# Patient Record
Sex: FEMALE | Race: OTHER | URBAN - METROPOLITAN AREA
[De-identification: names, ages, dates, MRNs, and addresses within clinical notes are randomized per-mention and may not be internally consistent; named-entity substitution may affect disease eponyms.]

---

## 2020-07-20 ENCOUNTER — EMERGENCY (EMERGENCY)
Facility: HOSPITAL | Age: 70
LOS: 0 days | Discharge: HOME | End: 2020-07-20
Attending: EMERGENCY MEDICINE | Admitting: EMERGENCY MEDICINE
Payer: MEDICAID

## 2020-07-20 VITALS
RESPIRATION RATE: 18 BRPM | HEART RATE: 87 BPM | OXYGEN SATURATION: 97 % | SYSTOLIC BLOOD PRESSURE: 169 MMHG | DIASTOLIC BLOOD PRESSURE: 79 MMHG | TEMPERATURE: 98 F

## 2020-07-20 DIAGNOSIS — I10 ESSENTIAL (PRIMARY) HYPERTENSION: ICD-10-CM

## 2020-07-20 DIAGNOSIS — M79.669 PAIN IN UNSPECIFIED LOWER LEG: ICD-10-CM

## 2020-07-20 DIAGNOSIS — M25.561 PAIN IN RIGHT KNEE: ICD-10-CM

## 2020-07-20 PROCEDURE — 99284 EMERGENCY DEPT VISIT MOD MDM: CPT

## 2020-07-20 PROCEDURE — 73562 X-RAY EXAM OF KNEE 3: CPT | Mod: 26,RT

## 2020-07-20 PROCEDURE — 73552 X-RAY EXAM OF FEMUR 2/>: CPT | Mod: 26,RT

## 2020-07-20 PROCEDURE — 73590 X-RAY EXAM OF LOWER LEG: CPT | Mod: 26,RT

## 2020-07-20 PROCEDURE — 73502 X-RAY EXAM HIP UNI 2-3 VIEWS: CPT | Mod: 26,RT

## 2020-07-20 PROCEDURE — 93970 EXTREMITY STUDY: CPT | Mod: 26

## 2020-07-20 RX ORDER — KETOROLAC TROMETHAMINE 30 MG/ML
15 SYRINGE (ML) INJECTION ONCE
Refills: 0 | Status: DISCONTINUED | OUTPATIENT
Start: 2020-07-20 | End: 2020-07-20

## 2020-07-20 RX ADMIN — Medication 15 MILLIGRAM(S): at 17:41

## 2020-07-20 NOTE — ED ADULT NURSE NOTE - CAS ELECT INFOMATION PROVIDED
all results reviewed with pt and daughter, dc instructions reveiwed with pt and daughter - understanding of instructions verbalized/DC instructions

## 2020-07-20 NOTE — ED ADULT NURSE NOTE - CHIEF COMPLAINT QUOTE
Pt c/o right leg pain. Pt states she has been having pain behind the knee and when she was walking today she felt a crack and has been in more pain since.

## 2020-07-20 NOTE — ED PROVIDER NOTE - PATIENT PORTAL LINK FT
You can access the FollowMyHealth Patient Portal offered by [facility] by registering at the following website: [email or web address]. By joining [employer]’s FollowMyHealth portal, you will also be able to view your health information using other applications (apps) compatible with our system.

## 2020-07-20 NOTE — ED PROVIDER NOTE - PHYSICAL EXAMINATION
Physical Exam    Vital Signs: I have reviewed the initial vital signs.  Constitutional: well-nourished, appears stated age, no acute distress  Eyes: Conjunctiva pink, Sclera clear,  Cardiovascular: S1 and S2, regular rate, regular rhythm, well-perfused extremities, radial pulses equal and 2+, pedal pulses 2+ and equal   Musculoskeletal: supple neck, no lower extremity edema, no midline tenderness, mild ttp to posterior right knee, pelvis stable, able to range right knee, able to ambulate with some pain  Integumentary: warm, dry, no rash  Neurologic: awake, alert,nvi

## 2020-07-20 NOTE — ED PROVIDER NOTE - NSFOLLOWUPINSTRUCTIONS_ED_ALL_ED_FT
Knee Pain    WHAT YOU NEED TO KNOW:    Knee pain may start suddenly, or it may be a long-term problem. You may have pain on the side, front, or back of your knee. You may have knee stiffness and swelling. You may hear popping sounds or feel like your knee is giving way or locking up as you walk. You may feel pain when you sit, stand, walk, or climb up and down stairs. Knee pain can be caused by conditions such as obesity, inflammation, or strains or tears in ligaments or tendons.     DISCHARGE INSTRUCTIONS:    Return to the emergency department if:     Your pain is worse, even after treatment.       You cannot bend or straighten your leg completely.       The swelling around your knee does not go down even with treatment.      Your knee is painful and hot to the touch.     Contact your healthcare provider if:     You have questions or concerns about your condition or care.         Medicines: You may need any of the following:     NSAIDs help decrease swelling and pain or fever. This medicine is available with or without a doctor's order. NSAIDs can cause stomach bleeding or kidney problems in certain people. If you take blood thinner medicine, always ask your healthcare provider if NSAIDs are safe for you. Always read the medicine label and follow directions.      Acetaminophen decreases pain and fever. It is available without a doctor's order. Ask how much to take and how often to take it. Follow directions. Read the labels of all other medicines you are using to see if they also contain acetaminophen, or ask your doctor or pharmacist. Acetaminophen can cause liver damage if not taken correctly. Do not use more than 4 grams (4,000 milligrams) total of acetaminophen in one day.       Prescription pain medicine may be given. Ask your healthcare provider how to take this medicine safely. Some prescription pain medicines contain acetaminophen. Do not take other medicines that contain acetaminophen without talking to your healthcare provider. Too much acetaminophen may cause liver damage. Prescription pain medicine may cause constipation. Ask your healthcare provider how to prevent or treat constipation.       Take your medicine as directed. Contact your healthcare provider if you think your medicine is not helping or if you have side effects. Tell him or her if you are allergic to any medicine. Keep a list of the medicines, vitamins, and herbs you take. Include the amounts, and when and why you take them. Bring the list or the pill bottles to follow-up visits. Carry your medicine list with you in case of an emergency.    What you can do to manage your symptoms:     Rest your knee so it can heal. Limit activities that increase your pain. Do low-impact exercises, such as walking or swimming.       Apply ice to help reduce swelling and pain. Use an ice pack, or put crushed ice in a plastic bag. Cover it with a towel before you apply it to your knee. Apply ice for 15 to 20 minutes every hour, or as directed.      Apply compression to help reduce swelling. Use a brace or bandage only as directed.      Elevate your knee to help decrease pain and swelling. Elevate your knee while you are sitting or lying down. Prop your leg on pillows to keep your knee above the level of your heart.      Prevent your knee from moving as directed. Your healthcare provider may put on a cast or splint. You may need to wear a leg brace to stabilize your knee. A leg brace can be adjusted to increase your range of motion as your knee heals.Hinged Knee Braces          What you can do to prevent knee pain:     Maintain a healthy weight. Extra weight increases your risk for knee pain. Ask your healthcare provider how much you should weigh. He or she can help you create a safe weight loss plan if you need to lose weight.      Exercise or train properly. Use the correct equipment for sports. Wear shoes that provide good support. Check your posture often as you exercise, play sports, or train for an event. This can help prevent stress and strain on your knees. Rest between sessions so you do not overwork your knees.    Follow up with your healthcare provider within 24 hours or as directed: You may need follow-up treatments, such as steroid injections to decrease pain. Write down your questions so you remember to ask them during your visits.        © Copyright [employer] 2019 All illustrations and images included in CareNotes are the copyrighted property of [employer] Inc[employer] or [employer].

## 2020-07-20 NOTE — ED PROVIDER NOTE - PROGRESS NOTE DETAILS
Attending Note:   68 yo F presents c/o few days of R posterior knee pain with walking. On exam: FROM of knee but pain with ambulation. Able to bare weight with pain. Plan: XR and DVT study

## 2020-07-20 NOTE — ED PROVIDER NOTE - CLINICAL SUMMARY MEDICAL DECISION MAKING FREE TEXT BOX
pt with posterior right knee pain, no signs of cellulitis, xray wnl, no dvt. pt ambulated given pain meds dc home

## 2020-07-20 NOTE — ED PROVIDER NOTE - OBJECTIVE STATEMENT
70 yo female, pmh of htn, presents to ed for right knee pain. started one weke ago, mild, aching, no radiation, worse with movement. Denies fever, chills, falls, numbness, tingling.

## 2021-07-27 PROBLEM — Z00.00 ENCOUNTER FOR PREVENTIVE HEALTH EXAMINATION: Status: ACTIVE | Noted: 2021-07-27

## 2022-05-29 ENCOUNTER — EMERGENCY (EMERGENCY)
Facility: HOSPITAL | Age: 72
LOS: 0 days | Discharge: HOME | End: 2022-05-29
Attending: EMERGENCY MEDICINE | Admitting: EMERGENCY MEDICINE
Payer: MEDICAID

## 2022-05-29 VITALS
RESPIRATION RATE: 20 BRPM | HEART RATE: 82 BPM | OXYGEN SATURATION: 98 % | TEMPERATURE: 98 F | SYSTOLIC BLOOD PRESSURE: 138 MMHG | WEIGHT: 164.91 LBS | DIASTOLIC BLOOD PRESSURE: 67 MMHG
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
## 2022-05-29 DIAGNOSIS — I10 ESSENTIAL (PRIMARY) HYPERTENSION: ICD-10-CM

## 2022-05-29 DIAGNOSIS — M25.562 PAIN IN LEFT KNEE: ICD-10-CM

## 2022-05-29 DIAGNOSIS — E03.9 HYPOTHYROIDISM, UNSPECIFIED: ICD-10-CM

## 2022-05-29 DIAGNOSIS — J45.909 UNSPECIFIED ASTHMA, UNCOMPLICATED: ICD-10-CM

## 2022-05-29 DIAGNOSIS — M79.89 OTHER SPECIFIED SOFT TISSUE DISORDERS: ICD-10-CM

## 2022-05-29 DIAGNOSIS — M79.605 PAIN IN LEFT LEG: ICD-10-CM

## 2022-05-29 DIAGNOSIS — S80.02XA CONTUSION OF LEFT KNEE, INITIAL ENCOUNTER: ICD-10-CM

## 2022-05-29 DIAGNOSIS — S80.12XA CONTUSION OF LEFT LOWER LEG, INITIAL ENCOUNTER: ICD-10-CM

## 2022-05-29 PROCEDURE — 73502 X-RAY EXAM HIP UNI 2-3 VIEWS: CPT | Mod: 26,LT

## 2022-05-29 PROCEDURE — 99284 EMERGENCY DEPT VISIT MOD MDM: CPT

## 2022-05-29 PROCEDURE — 93971 EXTREMITY STUDY: CPT | Mod: 26,LT

## 2022-05-29 PROCEDURE — 73562 X-RAY EXAM OF KNEE 3: CPT | Mod: 26,LT

## 2022-05-29 PROCEDURE — 73552 X-RAY EXAM OF FEMUR 2/>: CPT | Mod: 26,LT

## 2022-05-29 NOTE — ED PROVIDER NOTE - NSFOLLOWUPINSTRUCTIONS_ED_ALL_ED_FT
Fall Prevention in the Home    Falls can cause injuries. They can happen to people of all ages. There are many things you can do to make your home safe and to help prevent falls.     WHAT CAN I DO ON THE OUTSIDE OF MY HOME?  Regularly fix the edges of walkways and driveways and fix any cracks.  Remove anything that might make you trip as you walk through a door, such as a raised step or threshold.  Trim any bushes or trees on the path to your home.  Use bright outdoor lighting.  Clear any walking paths of anything that might make someone trip, such as rocks or tools.  Regularly check to see if handrails are loose or broken. Make sure that both sides of any steps have handrails.  Any raised decks and porches should have guardrails on the edges.  Have any leaves, snow, or ice cleared regularly.  Use sand or salt on walking paths during winter.  Clean up any spills in your garage right away. This includes oil or grease spills.    WHAT CAN I DO IN THE BATHROOM?  Use night lights.  Install grab bars by the toilet and in the tub and shower. Do not use towel bars as grab bars.  Use non-skid mats or decals in the tub or shower.  If you need to sit down in the shower, use a plastic, non-slip stool.  Keep the floor dry. Clean up any water that spills on the floor as soon as it happens.  Remove soap buildup in the tub or shower regularly.  Attach bath mats securely with double-sided non-slip rug tape.  Do not have throw rugs and other things on the floor that can make you trip.    WHAT CAN I DO IN THE BEDROOM?  Use night lights.  Make sure that you have a light by your bed that is easy to reach.  Do not use any sheets or blankets that are too big for your bed. They should not hang down onto the floor.  Have a firm chair that has side arms. You can use this for support while you get dressed.  Do not have throw rugs and other things on the floor that can make you trip.    WHAT CAN I DO IN THE KITCHEN?  Clean up any spills right away.  Avoid walking on wet floors.  Keep items that you use a lot in easy-to-reach places.  If you need to reach something above you, use a strong step stool that has a grab bar.  Keep electrical cords out of the way.  Do not use floor polish or wax that makes floors slippery. If you must use wax, use non-skid floor wax.  Do not have throw rugs and other things on the floor that can make you trip.    WHAT CAN I DO WITH MY STAIRS?  Do not leave any items on the stairs.  Make sure that there are handrails on both sides of the stairs and use them. Fix handrails that are broken or loose. Make sure that handrails are as long as the stairways.  Check any carpeting to make sure that it is firmly attached to the stairs. Fix any carpet that is loose or worn.  Avoid having throw rugs at the top or bottom of the stairs. If you do have throw rugs, attach them to the floor with carpet tape.  Make sure that you have a light switch at the top of the stairs and the bottom of the stairs. If you do not have them, ask someone to add them for you.    WHAT ELSE CAN I DO TO HELP PREVENT FALLS?  Wear shoes that:  Do not have high heels.  Have rubber bottoms.  Are comfortable and fit you well.  Are closed at the toe. Do not wear sandals.  If you use a stepladder:  Make sure that it is fully opened. Do not climb a closed stepladder.  Make sure that both sides of the stepladder are locked into place.  Ask someone to hold it for you, if possible.  Clearly ramona and make sure that you can see:  Any grab bars or handrails.  First and last steps.  Where the edge of each step is.  Use tools that help you move around (mobility aids) if they are needed. These include:  Canes.  Walkers.  Scooters.  Crutches.  Turn on the lights when you go into a dark area. Replace any light bulbs as soon as they burn out.  Set up your furniture so you have a clear path. Avoid moving your furniture around.  If any of your floors are uneven, fix them.  If there are any pets around you, be aware of where they are.  Review your medicines with your doctor. Some medicines can make you feel dizzy. This can increase your chance of falling.    Ask your doctor what other things that you can do to help prevent falls.    ADDITIONAL NOTES AND INSTRUCTIONS    Please follow up with your Primary MD in 24-48 hr.  Seek immediate medical care for any new/worsening signs or symptoms.         Knee Pain    Knee pain is a very common symptom and can have many causes. Knee pain often goes away when you follow your health care provider's instructions for relieving pain and discomfort at home. However, knee pain can develop into a condition that needs treatment. Some conditions may include:     Arthritis caused by wear and tear (osteoarthritis).  Arthritis caused by swelling and irritation (rheumatoid arthritis or gout).  A cyst or growth in your knee.  An infection in your knee joint.  An injury that will not heal.  Damage, swelling, or irritation of the tissues that support your knee (torn ligaments or tendinitis).    If your knee pain continues, additional tests may be ordered to diagnose your condition. Tests may include X-rays or other imaging studies of your knee. You may also need to have fluid removed from your knee. Treatment for ongoing knee pain depends on the cause, but treatment may include:    Medicines to relieve pain or swelling.  Steroid injections in your knee.  Physical therapy.  Surgery.    HOME CARE INSTRUCTIONS  Take medicines only as directed by your health care provider.   Rest your knee and keep it raised (elevated) while you are resting.  Do not do things that cause or worsen pain.  Avoid high-impact activities or exercises, such as running, jumping rope, or doing jumping jacks.  Apply ice to the knee area:  Put ice in a plastic bag.  Place a towel between your skin and the bag.  Leave the ice on for 20 minutes, 2–3 times a day.  Ask your health care provider if you should wear an elastic knee support.  Keep a pillow under your knee when you sleep.  Lose weight if you are overweight. Extra weight can put pressure on your knee.  Do not use any tobacco products, including cigarettes, chewing tobacco, or electronic cigarettes. If you need help quitting, ask your health care provider. Smoking may slow the healing of any bone and joint problems that you may have.    SEEK MEDICAL CARE IF:  Your knee pain continues, changes, or gets worse.  You have a fever along with knee pain.  Your knee brennen or locks up.  Your knee becomes more swollen.    SEEK IMMEDIATE MEDICAL CARE IF:  Your knee joint feels hot to the touch.  You have chest pain or trouble breathing.

## 2022-05-29 NOTE — ED PROVIDER NOTE - NS ED ATTENDING STATEMENT MOD
This was a shared visit with the [facility]. I reviewed and verified the documentation and independently performed the documented:

## 2022-05-29 NOTE — ED PROVIDER NOTE - CLINICAL SUMMARY MEDICAL DECISION MAKING FREE TEXT BOX
x-rays neg for fx.  Prelim LE duplex neg for DVT. pt to f/u with ortho as outpt, told to return to ER if she feels worse or for any other new/concerning symptoms. pt understands and agrees with plan.

## 2022-05-29 NOTE — ED PROVIDER NOTE - PATIENT PORTAL LINK FT
You can access the FollowMyHealth Patient Portal offered by [facility] by registering at the following website: [email or web address]. By joining [employer]’s FollowMyHealth portal, you will also be able to view your health information using other applications (apps) compatible with our system.

## 2022-05-29 NOTE — ED PROVIDER NOTE - PHYSICAL EXAMINATION
Constitutional: Well developed, well nourished. NAD  Head: Normocephalic, atraumatic.  Eyes: PERRL, EOMI.  ENT: No nasal discharge. Mucous membranes dry.  Neck: Supple. Painless ROM.  Cardiovascular: Normal S1, S2. Regular rate and rhythm.    Pulmonary: Normal respiratory rate and effort. Lungs clear to auscultation bilaterally.    Abdominal: Soft. Nondistended. No rebound, guarding, rigidity.  Extremities. Pelvis stable. no Left hip tenderness; + mild left knee tenderness, flex/ext intact with tenderness; no effusion no joint laxity; +eccyhymosis noted to left lower calf extending to lower ankle(no ankle tenderness) pulses and sensation intact bilat;  Skin: No rashes, cyanosis.  Neuro: AAOx3. No focal neurological deficits.  Psych: Normal mood. Normal affect.

## 2022-05-29 NOTE — ED PROVIDER NOTE - CARE PLAN
Principal Discharge DX:	Fall  Secondary Diagnosis:	Knee contusion  Secondary Diagnosis:	Traumatic ecchymosis of left lower leg   1

## 2022-05-29 NOTE — ED PROVIDER NOTE - CARE PROVIDER_API CALL
Matthieu Marroquin)  Orthopaedic Surgery  [address] [place] [address]  Phone: [phone]  Fax: [phone]  Follow Up Time: 1-3 Days

## 2022-05-29 NOTE — ED PROVIDER NOTE - NS ED ROS FT
Constitutional: (-) fever  Cardiovascular: (-) syncope  Integumentary: (-) rash  Musculoskeltal: Left leg pain s/p fall 1 week ago  Neurological: (-) altered mental status

## 2022-05-29 NOTE — ED PROVIDER NOTE - ATTENDING APP SHARED VISIT CONTRIBUTION OF CARE
72 y/o female with no asthma, htn, hypothyroidism, in ER for eval of fall which occurred 1 week ago. Pt tripped and fell onto L knee.  did not hit head, no LOC.  Having pain to knee/distal thigh, and now having some calf swelling. no CP/SOB.  no palpitations.  no ha/dizziness/syncope/near syncope.  no f/c.  no abd pain. no motor weakness/paresthesias.  PE - nad, nc/at, eomi, perrl, op - clear, mmm, no c-spine tenderness, cta b/l, no w/r/r, rrr, abd- soft, nt/nd, nabs, from x 4, LLE: able to range fully at hip, + mild tenderness to L knee, + ecchymosis to calf, 2+ DP pulse, A&O x 3, cn 2-12 intact, motor 5/5 b/l UE and LE, no sensory deficits.  -LLE x-rays, LE duplex.

## 2022-05-29 NOTE — ED PROVIDER NOTE - OBJECTIVE STATEMENT
71 yold female to Ed [facility] Asthma, Htn, hypothyroidism s/p fall 1 week ago in [place] - accidentally tripped and fell forward on left knee; pt c/o pain to left knee, distal femur and now with swelling to calf/lower ext; pt denies head injury, neck, chest, back or abdominal pain; pt ambulatory with pain worse over past 3 days; pt taking motrin for pain; denies use of asa/AC

## 2022-05-29 NOTE — ED ADULT NURSE NOTE - OBJECTIVE STATEMENT
Patient presents to ED in NAD a+ox4 co left lower leg pain s/p trip and fall 1 week ago. Pt denies LOC, hitting head, AC use. Bruising and discoloration noted to left leg. Pedal pulses present. Pt denies any other complaints.

## 2022-05-29 NOTE — ED ADULT NURSE NOTE - INTERVENTIONS DEFINITIONS
[place] to call system/Call bell, personal items and telephone within reach/Instruct patient to call for assistance/Room bathroom lighting operational/Non-slip footwear when patient is off stretcher/Physically safe environment: no spills, clutter or unnecessary equipment/Stretcher in lowest position, wheels locked, appropriate side rails in place/Monitor gait and stability/Monitor for mental status changes and reorient to person, place, and time/Review medications for side effects contributing to fall risk/Reinforce activity limits and safety measures with patient and family/Provide visual clues: red socks

## 2022-11-04 ENCOUNTER — EMERGENCY (EMERGENCY)
Facility: HOSPITAL | Age: 72
LOS: 0 days | Discharge: HOME | End: 2022-11-04
Admitting: STUDENT IN AN ORGANIZED HEALTH CARE EDUCATION/TRAINING PROGRAM

## 2022-11-04 VITALS
SYSTOLIC BLOOD PRESSURE: 121 MMHG | WEIGHT: 173.06 LBS | TEMPERATURE: 97 F | RESPIRATION RATE: 18 BRPM | HEART RATE: 71 BPM | DIASTOLIC BLOOD PRESSURE: 60 MMHG | OXYGEN SATURATION: 97 %

## 2022-11-04 VITALS
OXYGEN SATURATION: 97 % | RESPIRATION RATE: 18 BRPM | SYSTOLIC BLOOD PRESSURE: 123 MMHG | TEMPERATURE: 98 F | DIASTOLIC BLOOD PRESSURE: 60 MMHG | HEART RATE: 70 BPM

## 2022-11-04 DIAGNOSIS — M79.89 OTHER SPECIFIED SOFT TISSUE DISORDERS: ICD-10-CM

## 2022-11-04 DIAGNOSIS — M79.661 PAIN IN RIGHT LOWER LEG: ICD-10-CM

## 2022-11-04 PROCEDURE — 99285 EMERGENCY DEPT VISIT HI MDM: CPT

## 2022-11-04 PROCEDURE — 93970 EXTREMITY STUDY: CPT | Mod: 26

## 2022-11-04 NOTE — ED PROVIDER NOTE - CARE PROVIDER_API CALL
Darian Sales)  Surgery; Vascular Surgery  [address] [place] [address]  Phone: [phone]  Fax: [phone]  Follow Up Time:

## 2022-11-04 NOTE — ED PROVIDER NOTE - NSFOLLOWUPINSTRUCTIONS_ED_ALL_ED_FT
WHAT YOU NEED TO KNOW:    Leg edema is swelling caused by fluid buildup. Your legs may swell if you sit or stand for long periods of time, are pregnant, or are injured. Swelling may also occur if you have heart failure or circulation problems. This means that your heart does not pump blood through your body as it should.    DISCHARGE INSTRUCTIONS:    Self-care:     Elevate your legs: Raise your legs above the level of your heart as often as you can. This will help decrease swelling and pain. Prop your legs on pillows or blankets to keep them elevated comfortably.      Wear pressure stockings: These tight stockings put pressure on your legs to promote blood flow and prevent blood clots. Wear the stockings during the day. Do not wear them while you sleep.      Apply heat: Heat helps decrease pain and swelling. Apply heat on the area for 20 to 30 minutes every 2 hours for as many days as directed.       Stay active: Do not stand or sit for long periods of time. Ask your healthcare provider about the best exercise plan for you.      Eat healthy foods: Healthy foods include fruits, vegetables, whole-grain breads, low-fat dairy products, beans, lean meats, and fish. Ask if you need to be on a special diet. Limit salt. Salt will make your body hold even more fluid.    Follow up with your healthcare provider as directed: Write down your questions so you remember to ask them during your visits.     Contact your healthcare provider if:     You have a fever or feel more tired than usual.      The veins in your legs look larger than usual. They may look full or bulging.      Your legs itch or feel heavy.      You have red or white areas or sores on your legs. The skin may also appear dimpled or have indentations.      You are gaining weight.      You have trouble moving your ankles.      The swelling does not go away, or other parts of your body swell.      You have questions or concerns about your condition or care.    Return to the emergency department if:     You cannot walk.      You feel faint or confused.       Your skin turns blue or gray.      Your leg feels warm, tender, and painful. It may be swollen and red.      You have chest pain or trouble breathing that is worse when you lie down.      You suddenly feel lightheaded and have trouble breathing.      You have new and sudden chest pain. You may have more pain when you take deep breaths or cough. You may also cough up blood. Follow up with vascular surgery within 2-4 days for reassessment.  Wear compression stocking and elevate leg 3-4 times a day for 15-20 minutes.   Return for chest, pain, shortness of breath, lightheadedness, passing out or other concerning symptoms.       WHAT YOU NEED TO KNOW:    Leg edema is swelling caused by fluid buildup. Your legs may swell if you sit or stand for long periods of time, are pregnant, or are injured. Swelling may also occur if you have heart failure or circulation problems. This means that your heart does not pump blood through your body as it should.    DISCHARGE INSTRUCTIONS:    Self-care:     Elevate your legs: Raise your legs above the level of your heart as often as you can. This will help decrease swelling and pain. Prop your legs on pillows or blankets to keep them elevated comfortably.      Wear pressure stockings: These tight stockings put pressure on your legs to promote blood flow and prevent blood clots. Wear the stockings during the day. Do not wear them while you sleep.      Apply heat: Heat helps decrease pain and swelling. Apply heat on the area for 20 to 30 minutes every 2 hours for as many days as directed.       Stay active: Do not stand or sit for long periods of time. Ask your healthcare provider about the best exercise plan for you.      Eat healthy foods: Healthy foods include fruits, vegetables, whole-grain breads, low-fat dairy products, beans, lean meats, and fish. Ask if you need to be on a special diet. Limit salt. Salt will make your body hold even more fluid.    Follow up with your healthcare provider as directed: Write down your questions so you remember to ask them during your visits.     Contact your healthcare provider if:     You have a fever or feel more tired than usual.      The veins in your legs look larger than usual. They may look full or bulging.      Your legs itch or feel heavy.      You have red or white areas or sores on your legs. The skin may also appear dimpled or have indentations.      You are gaining weight.      You have trouble moving your ankles.      The swelling does not go away, or other parts of your body swell.      You have questions or concerns about your condition or care.    Return to the emergency department if:     You cannot walk.      You feel faint or confused.       Your skin turns blue or gray.      Your leg feels warm, tender, and painful. It may be swollen and red.      You have chest pain or trouble breathing that is worse when you lie down.      You suddenly feel lightheaded and have trouble breathing.      You have new and sudden chest pain. You may have more pain when you take deep breaths or cough. You may also cough up blood.

## 2022-11-04 NOTE — ED ADULT NURSE NOTE - NSIMPLEMENTINTERV_GEN_ALL_ED
[employer] dose 1 and 2
Implemented All Universal Safety Interventions:  [place] to call system. Call bell, personal items and telephone within reach. Instruct patient to call for assistance. Room bathroom lighting operational. Non-slip footwear when patient is off stretcher. Physically safe environment: no spills, clutter or unnecessary equipment. Stretcher in lowest position, wheels locked, appropriate side rails in place.

## 2022-11-04 NOTE — ED PROVIDER NOTE - ATTENDING APP SHARED VISIT CONTRIBUTION OF CARE
71 yo f hx htn  pt presents for eval of ~1 week of RLE swelling. mild pain. pt states she thought it was related to med adjustment (starting losartan) but dc losartan did not improve the swelling. pt came in to r/o dvt. no cp/sob/fevers.    vss  gen- NAD, aaox3  card-rrr  lungs-ctab, no wheezing or rhonchi  neuro- full str/sensation, cn ii-xii grossly intact, normal coordination and gait  RLE- swelling and mild tenderness to calf, multiple varicose veins, DP 2+    will r/o dvt

## 2022-11-04 NOTE — ED PROVIDER NOTE - OBJECTIVE STATEMENT
73 yo female, presents to ed for right lower leg swelling, x 1 week, some pain, mild, aching, no radiation. Denies fever, chills, cp, sob, neck pain, visual changes, nvd, dizziness, numbness, tingling.

## 2022-11-04 NOTE — ED ADULT NURSE NOTE - NSFALLRSKASSESSTYPE_ED_ALL_ED
Addended by: ALEXANDRIA SUE on: 8/23/2021 03:16 PM     Modules accepted: Orders     Initial (On Arrival)

## 2022-11-04 NOTE — ED PROVIDER NOTE - PHYSICAL EXAMINATION
Physical Exam    Vital Signs: I have reviewed the initial vital signs.  Constitutional: appears stated age, no acute distress  Eyes: Conjunctiva pink, Sclera clear  Musculoskeletal: supple neck, mild right lower extremity edema, no midline tenderness  Integumentary: warm, dry, no rash  Neurologic: awake, alert, nvi

## 2022-11-04 NOTE — ED PROVIDER NOTE - NS ED ROS FT
Constitutional: (-) fever, (-) chills  Eyes: (-) visual changes  ENT: (-) nasal congestions  Cardiovascular: (-) chest pain, (-) syncope  Respiratory: (-) cough, (-) shortness of breath, (-) dyspnea,   Gastrointestinal: (-) vomiting, (-) diarrhea, (-)nausea,  Musculoskeletal: (-) neck pain, (-) back pain, (+) leg pain,  Integumentary: (-) rash, (-) edema, (-) bruises  Neuro: (-) tingling, (-)numbness,  Peripheral Vascular: (+) leg swelling  :  (-)dysuria,  (-) hematuria  Allergic/Immunologic: (-) pruritus

## 2022-11-04 NOTE — ED PROVIDER NOTE - PATIENT PORTAL LINK FT
You can access the FollowMyHealth Patient Portal offered by [facility] by registering at the following website: [email or web address]. By joining [employer]’s FollowMyHealth portal, you will also be able to view your health information using other applications (apps) compatible with our system.

## 2022-11-15 NOTE — ED ADULT TRIAGE NOTE - PAIN: PRESENCE, MLM
Body Location Override (Optional - Billing Will Still Be Based On Selected Body Map Location If Applicable): superior chin Size Of Lesion: 1.5cm Morphology Per Location (Optional): even dark brown grey thin papule Detail Level: Detailed Size Of Lesion: 5mm Morphology Per Location (Optional): even dark brown grey smooth papule Body Location Override (Optional - Billing Will Still Be Based On Selected Body Map Location If Applicable): inferior chin complains of pain/discomfort

## 2022-11-17 ENCOUNTER — APPOINTMENT (OUTPATIENT)
Dept: VASCULAR SURGERY | Facility: CLINIC | Age: 72
End: 2022-11-17
Payer: SUBSIDIZED

## 2022-11-17 VITALS — BODY MASS INDEX: 35.68 KG/M2 | WEIGHT: 170 LBS | HEIGHT: 58 IN

## 2022-11-17 DIAGNOSIS — E78.00 PURE HYPERCHOLESTEROLEMIA, UNSPECIFIED: ICD-10-CM

## 2022-11-17 DIAGNOSIS — I10 ESSENTIAL (PRIMARY) HYPERTENSION: ICD-10-CM

## 2022-11-17 DIAGNOSIS — J45.909 UNSPECIFIED ASTHMA, UNCOMPLICATED: ICD-10-CM

## 2022-11-17 PROCEDURE — 99204 OFFICE O/P NEW MOD 45 MIN: CPT

## 2022-11-17 PROCEDURE — 93970 EXTREMITY STUDY: CPT

## 2022-11-17 RX ORDER — LISINOPRIL 10 MG/1
10 TABLET ORAL
Refills: 0 | Status: ACTIVE | COMMUNITY

## 2022-11-17 RX ORDER — ASPIRIN 81 MG
81 TABLET, DELAYED RELEASE (ENTERIC COATED) ORAL
Refills: 0 | Status: ACTIVE | COMMUNITY

## 2022-11-17 RX ORDER — GABAPENTIN 100 MG/1
100 CAPSULE ORAL
Refills: 0 | Status: ACTIVE | COMMUNITY

## 2022-11-17 RX ORDER — HYDROCHLOROTHIAZIDE 25 MG/1
25 TABLET ORAL
Refills: 0 | Status: ACTIVE | COMMUNITY

## 2022-11-17 RX ORDER — ROSUVASTATIN CALCIUM 5 MG/1
5 TABLET, FILM COATED ORAL
Refills: 0 | Status: ACTIVE | COMMUNITY

## 2022-11-17 RX ORDER — DOCUSATE SODIUM 100 MG/1
100 CAPSULE, LIQUID FILLED ORAL
Refills: 0 | Status: ACTIVE | COMMUNITY

## 2022-11-17 RX ORDER — LEVOTHYROXINE SODIUM 88 UG/1
88 TABLET ORAL
Refills: 0 | Status: ACTIVE | COMMUNITY

## 2022-11-17 NOTE — HISTORY OF PRESENT ILLNESS
[FreeTextEntry1] : 71 y/o female sent in for evaluation of right leg swelling worsening over the past 2 weeks, denies any h/o DVT. Swelling improves with leg elevation and worse at the end of the day, denies any fevers, or chills. C/o left leg pain.

## 2022-11-17 NOTE — ASSESSMENT
[FreeTextEntry1] : 73 y/o female with right leg swelling for the past 2 weeks.\par \par No evidence of DVT in the lower extremities on duplex today.\par \par I am prescribing her thigh- high compression stockings to be worn daily during the day and I will see her back in 2 weeks time.

## 2022-11-17 NOTE — DATA REVIEWED
[FreeTextEntry1] : I performed a venous duplex which was medically necessary to evaluate for DVT or iliac vein compression. It showed no evidence of DVT in the lower extremities bilaterally, no evidence of iliac vein thrombosis.\par

## 2022-12-01 ENCOUNTER — APPOINTMENT (OUTPATIENT)
Dept: VASCULAR SURGERY | Facility: CLINIC | Age: 72
End: 2022-12-01

## 2022-12-01 VITALS — BODY MASS INDEX: 36.31 KG/M2 | HEIGHT: 58 IN | WEIGHT: 173 LBS

## 2022-12-01 PROCEDURE — 99213 OFFICE O/P EST LOW 20 MIN: CPT

## 2022-12-01 PROCEDURE — 93971 EXTREMITY STUDY: CPT | Mod: RT

## 2022-12-01 RX ORDER — CEPHALEXIN 500 MG/1
500 TABLET ORAL 3 TIMES DAILY
Qty: 30 | Refills: 0 | Status: ACTIVE | COMMUNITY
Start: 2022-12-01 | End: 1900-01-01

## 2022-12-01 NOTE — DATA REVIEWED
[FreeTextEntry1] : I performed a venous duplex which was medically necessary to evaluate for DVT or iliac vein compression. It showed no evidence of DVT in the lower extremity on the right\par

## 2022-12-01 NOTE — ASSESSMENT
[FreeTextEntry1] : 71 y/o female with right leg swelling for the past 2 weeks.\par \par No evidence of DVT in the lower extremities on duplex today.\par \par The leg appears more swollen with warmth and erythema. I started her on antibiotics for 10 days. If this does not improve I will consider a venogram.  No

## 2022-12-01 NOTE — HISTORY OF PRESENT ILLNESS
[FreeTextEntry1] : 73 y/o female sent in for evaluation of right leg swelling worsening over the past 2 weeks, denies any h/o DVT. Swelling improves with leg elevation and worse at the end of the day, denies any fevers, or chills. C/o left leg pain.\par \par She returns today with worsening swelling in the leg.

## 2022-12-08 ENCOUNTER — APPOINTMENT (OUTPATIENT)
Dept: VASCULAR SURGERY | Facility: CLINIC | Age: 72
End: 2022-12-08

## 2022-12-08 VITALS — WEIGHT: 175 LBS | BODY MASS INDEX: 36.73 KG/M2 | HEIGHT: 58 IN

## 2022-12-08 PROCEDURE — 99212 OFFICE O/P EST SF 10 MIN: CPT

## 2022-12-08 NOTE — HISTORY OF PRESENT ILLNESS
[FreeTextEntry1] : 71 y/o female sent in for evaluation of right leg swelling worsening over the past 2 weeks, denies any h/o DVT. Swelling improves with leg elevation and worse at the end of the day, denies any fevers, or chills. C/o left leg pain.\par \par She returns today with worsening swelling in the leg.

## 2022-12-08 NOTE — ASSESSMENT
[FreeTextEntry1] : 71 y/o female with right leg swelling for the past 2 weeks.\par \par No evidence of DVT in the lower extremities on duplex today.\par \par The leg appears more swollen with warmth and erythema. I started her on antibiotics for 10 days. The leg is not improving. I will plan on a venogram next week.

## 2022-12-11 ENCOUNTER — LABORATORY RESULT (OUTPATIENT)
Age: 72
End: 2022-12-11

## 2022-12-14 ENCOUNTER — TRANSCRIPTION ENCOUNTER (OUTPATIENT)
Age: 72
End: 2022-12-14

## 2022-12-14 ENCOUNTER — OUTPATIENT (OUTPATIENT)
Dept: OUTPATIENT SERVICES | Facility: HOSPITAL | Age: 72
LOS: 1 days | Discharge: HOME | End: 2022-12-14
Payer: SUBSIDIZED

## 2022-12-14 VITALS
HEART RATE: 78 BPM | RESPIRATION RATE: 20 BRPM | DIASTOLIC BLOOD PRESSURE: 68 MMHG | WEIGHT: 169.98 LBS | TEMPERATURE: 98 F | OXYGEN SATURATION: 95 % | HEIGHT: 64 IN | SYSTOLIC BLOOD PRESSURE: 135 MMHG

## 2022-12-14 VITALS
SYSTOLIC BLOOD PRESSURE: 128 MMHG | RESPIRATION RATE: 16 BRPM | DIASTOLIC BLOOD PRESSURE: 68 MMHG | HEART RATE: 65 BPM | OXYGEN SATURATION: 99 %

## 2022-12-14 PROCEDURE — 37252 INTRVASC US NONCORONARY 1ST: CPT

## 2022-12-14 PROCEDURE — 37248 TRLUML BALO ANGIOP 1ST VEIN: CPT

## 2022-12-14 PROCEDURE — 36011 PLACE CATHETER IN VEIN: CPT

## 2022-12-14 PROCEDURE — 76937 US GUIDE VASCULAR ACCESS: CPT | Mod: 26

## 2022-12-14 RX ORDER — HYDROMORPHONE HYDROCHLORIDE 2 MG/ML
0.5 INJECTION INTRAMUSCULAR; INTRAVENOUS; SUBCUTANEOUS
Refills: 0 | Status: DISCONTINUED | OUTPATIENT
Start: 2022-12-14 | End: 2022-12-14

## 2022-12-14 RX ORDER — MEPERIDINE HYDROCHLORIDE 50 MG/ML
12.5 INJECTION INTRAMUSCULAR; INTRAVENOUS; SUBCUTANEOUS
Refills: 0 | Status: DISCONTINUED | OUTPATIENT
Start: 2022-12-14 | End: 2022-12-14

## 2022-12-14 RX ORDER — HYDROMORPHONE HYDROCHLORIDE 2 MG/ML
1 INJECTION INTRAMUSCULAR; INTRAVENOUS; SUBCUTANEOUS
Refills: 0 | Status: DISCONTINUED | OUTPATIENT
Start: 2022-12-14 | End: 2022-12-14

## 2022-12-14 RX ORDER — LEVOTHYROXINE SODIUM 125 MCG
1 TABLET ORAL
Qty: 0 | Refills: 0 | DISCHARGE

## 2022-12-14 RX ORDER — SODIUM CHLORIDE 9 MG/ML
1000 INJECTION, SOLUTION INTRAVENOUS
Refills: 0 | Status: DISCONTINUED | OUTPATIENT
Start: 2022-12-14 | End: 2022-12-14

## 2022-12-14 RX ORDER — OXYCODONE AND ACETAMINOPHEN 5; 325 MG/1; MG/1
2 TABLET ORAL ONCE
Refills: 0 | Status: DISCONTINUED | OUTPATIENT
Start: 2022-12-14 | End: 2022-12-14

## 2022-12-14 RX ORDER — HYDROCHLOROTHIAZIDE 25 MG
1 TABLET ORAL
Qty: 0 | Refills: 0 | DISCHARGE

## 2022-12-14 RX ORDER — ONDANSETRON 8 MG/1
4 TABLET, FILM COATED ORAL ONCE
Refills: 0 | Status: DISCONTINUED | OUTPATIENT
Start: 2022-12-14 | End: 2022-12-14

## 2022-12-14 RX ORDER — OXYCODONE AND ACETAMINOPHEN 5; 325 MG/1; MG/1
1 TABLET ORAL ONCE
Refills: 0 | Status: DISCONTINUED | OUTPATIENT
Start: 2022-12-14 | End: 2022-12-14

## 2022-12-14 NOTE — BRIEF OPERATIVE NOTE - NSICDXBRIEFPROCEDURE_GEN_ALL_CORE_FT
PROCEDURES:  Venogram, iliac 14-Dec-2022 09:29:17  Karena Escalante  Venoplasty, intraoperative, with fluoroscopic guidance 14-Dec-2022 09:29:37  Karena Escalante

## 2022-12-14 NOTE — PRE-OP CHECKLIST - AICD PRESENT
December 14, 2017      Ngozi Gruber MD  [address]  [facility] [address]           Dany Eddi - Otorhinolaryngology  [address]  [facility] [address]  Phone: [phone]  Fax: [phone]          Patient: Sarita Fuller   MR Number: [id number]   YOB: 2016   Date of Visit: 12/12/2017       Dear Dr. Ngozi Gruber:    Thank you for referring Sarita Fuller to me for evaluation. Attached you will find relevant portions of my assessment and plan of care.    If you have questions, please do not hesitate to call me. I look forward to following Sarita Fuller along with you.    Sincerely,    Otoniel Mccracken MD    Enclosure  CC:  No Recipients    If you would like to receive this communication electronically, please contact [email or web address] or [phone] to request more information on [employer] access.    For providers and/or their staff who would like to refer a patient to [facility], please contact us through our one-stop-shop provider referral line, [facility], at [phone].    If you feel you have received this communication in error or would no longer like to receive these types of communications, please e-mail [email or web address]         
no

## 2022-12-14 NOTE — ASU DISCHARGE PLAN (ADULT/PEDIATRIC) - NS MD DC FALL RISK RISK
For information on Fall & Injury Prevention, visit: [email or web address] OR  [email or web address] OR  https://www.cdc.gov/steadi/patient.html

## 2022-12-14 NOTE — H&P PST ADULT - ASSESSMENT
preop: right lower extremity swelling   operation: right lower extremity venogram, possible endovascular revascularization

## 2022-12-14 NOTE — ASU DISCHARGE PLAN (ADULT/PEDIATRIC) - CARE PROVIDER_API CALL
Darian Sales)  Surgery; Vascular Surgery  [address] [place] [address]  Phone: [phone]  Fax: [phone]  Follow Up Time: 2 weeks

## 2022-12-14 NOTE — BRIEF OPERATIVE NOTE - OPERATION/FINDINGS
[FreeTextEntry1] : Gabriel was the 9 pound product of a 40 week gestation, born by repeat  to a  mother.  The pregnancy and birth histories are unremarkable.  He did well in the  period and went home with his mother at 3 days of age. 
Operation:   1. US guided bilateral CFV access 8Fr sheath  2. Right iliac and common femoral venogram  3. IVUS SVC, R iliac, R CFV, R FV, R popliteal vein  4. R iliac venoplasty 10mm x 40mm Ultraverse balloon    Findings: Short segment narrowing at R external iliac/CFV junction that did not appear severe enough to explain persistent swelling.

## 2022-12-19 DIAGNOSIS — M79.89 OTHER SPECIFIED SOFT TISSUE DISORDERS: ICD-10-CM

## 2022-12-29 ENCOUNTER — APPOINTMENT (OUTPATIENT)
Dept: VASCULAR SURGERY | Facility: CLINIC | Age: 72
End: 2022-12-29
Payer: SUBSIDIZED

## 2022-12-29 VITALS — BODY MASS INDEX: 34.63 KG/M2 | HEIGHT: 58 IN | WEIGHT: 165 LBS

## 2022-12-29 DIAGNOSIS — L03.115 CELLULITIS OF RIGHT LOWER LIMB: ICD-10-CM

## 2022-12-29 DIAGNOSIS — R10.9 UNSPECIFIED ABDOMINAL PAIN: ICD-10-CM

## 2022-12-29 PROCEDURE — 99213 OFFICE O/P EST LOW 20 MIN: CPT

## 2022-12-29 RX ORDER — DOXYCYCLINE HYCLATE 100 MG/1
100 TABLET ORAL
Qty: 28 | Refills: 0 | Status: ACTIVE | COMMUNITY
Start: 2022-12-29 | End: 1900-01-01

## 2022-12-29 NOTE — ASSESSMENT
[FreeTextEntry1] : 73 y/o female with right leg swelling for the past 2 weeks.\par \par No evidence of DVT in the lower extremities on duplex today.\par \par The leg appears more swollen with warmth and erythema. I started her on antibiotics for 14 days. I will send her for a CT scan of the abdomen and pelvis to evaluate her abdominal pain. I will see her back in the office in 2 weeks for evaluation.

## 2022-12-29 NOTE — HISTORY OF PRESENT ILLNESS
[FreeTextEntry1] : 73 y/o female sent in for evaluation of right leg swelling worsening over the past 2 weeks, denies any h/o DVT. Swelling improves with leg elevation and worse at the end of the day, denies any fevers, or chills. C/o left leg pain.\par \par She returns today with continued leg swelling and pain. She also complains of pelvic congestion and pain.

## 2022-12-30 LAB
ALBUMIN SERPL ELPH-MCNC: 4.3 G/DL
ALP BLD-CCNC: 67 U/L
ALT SERPL-CCNC: 15 U/L
ANION GAP SERPL CALC-SCNC: 13 MMOL/L
AST SERPL-CCNC: 18 U/L
BASOPHILS # BLD AUTO: 0.03 K/UL
BASOPHILS NFR BLD AUTO: 0.6 %
BILIRUB SERPL-MCNC: 0.6 MG/DL
BUN SERPL-MCNC: 13 MG/DL
CALCIUM SERPL-MCNC: 9.6 MG/DL
CHLORIDE SERPL-SCNC: 100 MMOL/L
CO2 SERPL-SCNC: 28 MMOL/L
CREAT SERPL-MCNC: 0.7 MG/DL
EGFR: 91 ML/MIN/1.73M2
EOSINOPHIL # BLD AUTO: 0.14 K/UL
EOSINOPHIL NFR BLD AUTO: 2.7 %
GLUCOSE SERPL-MCNC: 139 MG/DL
HCT VFR BLD CALC: 38.4 %
HGB BLD-MCNC: 12.3 G/DL
IMM GRANULOCYTES NFR BLD AUTO: 0.2 %
LYMPHOCYTES # BLD AUTO: 2.08 K/UL
LYMPHOCYTES NFR BLD AUTO: 39.4 %
MAN DIFF?: NORMAL
MCHC RBC-ENTMCNC: 30 PG
MCHC RBC-ENTMCNC: 32 G/DL
MCV RBC AUTO: 93.7 FL
MONOCYTES # BLD AUTO: 0.36 K/UL
MONOCYTES NFR BLD AUTO: 6.8 %
NEUTROPHILS # BLD AUTO: 2.66 K/UL
NEUTROPHILS NFR BLD AUTO: 50.3 %
PLATELET # BLD AUTO: 198 K/UL
POTASSIUM SERPL-SCNC: 3.5 MMOL/L
PROT SERPL-MCNC: 6.6 G/DL
RBC # BLD: 4.1 M/UL
RBC # FLD: 14 %
SODIUM SERPL-SCNC: 141 MMOL/L
WBC # FLD AUTO: 5.28 K/UL

## 2023-01-01 NOTE — ASU DISCHARGE PLAN (ADULT/PEDIATRIC) - FOLLOW UP APPOINTMENTS
Statement Selected
[facility],  Endoscopy/[facility]

## 2023-01-15 ENCOUNTER — RESULT REVIEW (OUTPATIENT)
Age: 73
End: 2023-01-15

## 2023-01-15 ENCOUNTER — OUTPATIENT (OUTPATIENT)
Dept: OUTPATIENT SERVICES | Facility: HOSPITAL | Age: 73
LOS: 1 days | Discharge: HOME | End: 2023-01-15
Payer: SUBSIDIZED

## 2023-01-15 DIAGNOSIS — R10.9 UNSPECIFIED ABDOMINAL PAIN: ICD-10-CM

## 2023-01-15 PROCEDURE — 74177 CT ABD & PELVIS W/CONTRAST: CPT | Mod: 26

## 2023-02-02 ENCOUNTER — APPOINTMENT (OUTPATIENT)
Dept: VASCULAR SURGERY | Facility: CLINIC | Age: 73
End: 2023-02-02
Payer: SUBSIDIZED

## 2023-02-02 VITALS — BODY MASS INDEX: 34.63 KG/M2 | WEIGHT: 165 LBS | HEIGHT: 58 IN

## 2023-02-02 PROCEDURE — 99213 OFFICE O/P EST LOW 20 MIN: CPT

## 2023-02-02 NOTE — DATA REVIEWED
[FreeTextEntry1] : I reviewed her duplex, CT scan and venogram. There is no evidence of external compression or pelvic mass. Her labs were normal aside from a mildly elevated glucose to 136.

## 2023-02-02 NOTE — ASSESSMENT
[FreeTextEntry1] : 71 y/o female with right leg swelling for the past few weeks. There is no abnormality on imaging. Her labs appear normal as well. I recommended that she change her BP meds again as the swelling became worse with the new medication. I also recommended compression with lymphapress pumps to help reduce the swelling. I will see her back in 3 months for follow up.

## 2023-02-02 NOTE — HISTORY OF PRESENT ILLNESS
[FreeTextEntry1] : 71 y/o female sent in for evaluation of right leg swelling worsening over the past 2 weeks, denies any h/o DVT. Swelling improves with leg elevation and worse at the end of the day, denies any fevers, or chills. C/o left leg pain.\par \par She returns today with continued leg swelling and pain. She also complains of pelvic congestion and pain. \par \par

## 2023-05-09 ENCOUNTER — APPOINTMENT (OUTPATIENT)
Dept: VASCULAR SURGERY | Facility: CLINIC | Age: 73
End: 2023-05-09
Payer: COMMERCIAL

## 2023-05-09 VITALS
DIASTOLIC BLOOD PRESSURE: 81 MMHG | WEIGHT: 165 LBS | HEIGHT: 58 IN | SYSTOLIC BLOOD PRESSURE: 164 MMHG | HEART RATE: 79 BPM | BODY MASS INDEX: 34.63 KG/M2

## 2023-05-09 PROCEDURE — 99212 OFFICE O/P EST SF 10 MIN: CPT

## 2023-05-09 NOTE — ASSESSMENT
[FreeTextEntry1] : 73 y/o female with right leg lymphedema.  I recommend she continue with lymphatic compression pumps.  I am also referring her to a lymphedema clinic for manual compression and drainage.  I will see the patient back in my office in 6 months time or sooner if any new symptoms develop

## 2023-05-09 NOTE — HISTORY OF PRESENT ILLNESS
[FreeTextEntry1] : 71 y/o female who presents for follow-up of her right lower extremity swelling.  The patient is well-known to me she had a CT scan of her pelvis to evaluate the pelvic congestion syndrome as well as pelvic compression.  The patient also had a venogram to rule out May Renner syndrome.  The patient had multiple venous duplex exams that showed no evidence of DVT present.  The patient is currently been using a Lymphapress pump consistently as well as wearing compression stocking therapy.  Her swelling is not worse however not overall improved.\par

## 2023-05-24 ENCOUNTER — OUTPATIENT (OUTPATIENT)
Dept: OUTPATIENT SERVICES | Facility: HOSPITAL | Age: 73
LOS: 1 days | End: 2023-05-24
Payer: COMMERCIAL

## 2023-05-24 DIAGNOSIS — Z00.00 ENCOUNTER FOR GENERAL ADULT MEDICAL EXAMINATION WITHOUT ABNORMAL FINDINGS: ICD-10-CM

## 2023-05-24 PROCEDURE — 99204 OFFICE O/P NEW MOD 45 MIN: CPT

## 2023-05-25 DIAGNOSIS — Z00.00 ENCOUNTER FOR GENERAL ADULT MEDICAL EXAMINATION WITHOUT ABNORMAL FINDINGS: ICD-10-CM

## 2023-06-21 ENCOUNTER — OUTPATIENT (OUTPATIENT)
Dept: OUTPATIENT SERVICES | Facility: HOSPITAL | Age: 73
LOS: 1 days | End: 2023-06-21
Payer: COMMERCIAL

## 2023-06-21 ENCOUNTER — APPOINTMENT (OUTPATIENT)
Dept: ENDOCRINOLOGY | Facility: CLINIC | Age: 73
End: 2023-06-21

## 2023-06-21 VITALS
HEART RATE: 81 BPM | DIASTOLIC BLOOD PRESSURE: 75 MMHG | HEIGHT: 58 IN | WEIGHT: 169 LBS | SYSTOLIC BLOOD PRESSURE: 111 MMHG | BODY MASS INDEX: 35.48 KG/M2

## 2023-06-21 DIAGNOSIS — Z00.00 ENCOUNTER FOR GENERAL ADULT MEDICAL EXAMINATION WITHOUT ABNORMAL FINDINGS: ICD-10-CM

## 2023-06-21 DIAGNOSIS — I89.0 LYMPHEDEMA, NOT ELSEWHERE CLASSIFIED: ICD-10-CM

## 2023-06-21 DIAGNOSIS — E03.9 HYPOTHYROIDISM, UNSPECIFIED: ICD-10-CM

## 2023-06-21 PROCEDURE — 97162 PT EVAL MOD COMPLEX 30 MIN: CPT | Mod: GP

## 2023-06-21 PROCEDURE — T1013: CPT

## 2023-06-21 PROCEDURE — 99204 OFFICE O/P NEW MOD 45 MIN: CPT

## 2023-06-22 DIAGNOSIS — I89.0 LYMPHEDEMA, NOT ELSEWHERE CLASSIFIED: ICD-10-CM

## 2023-06-23 NOTE — REVIEW OF SYSTEMS
[Fatigue] : no fatigue [Recent Weight Gain (___ Lbs)] : no recent weight gain [Recent Weight Loss (___ Lbs)] : no recent weight loss [Eye Pain] : no pain [Blurred Vision] : no blurred vision [Dysphagia] : no dysphagia [Neck Pain] : no neck pain [Chest Pain] : no chest pain [Palpitations] : no palpitations [Shortness Of Breath] : no shortness of breath [Cough] : no cough [Nausea] : no nausea [Vomiting] : no vomiting [Diarrhea] : no diarrhea [Cold Intolerance] : no cold intolerance [Heat Intolerance] : no heat intolerance

## 2023-06-23 NOTE — PHYSICAL EXAM
[Alert] : alert [No Acute Distress] : no acute distress [No Neck Mass] : no neck mass was observed [No LAD] : no lymphadenopathy [Thyroid Not Enlarged] : the thyroid was not enlarged [No Respiratory Distress] : no respiratory distress [Clear to Auscultation] : lungs were clear to auscultation bilaterally [Normal PMI] : the apical impulse was normal [Normal S1, S2] : normal S1 and S2 [Normal Rate] : heart rate was normal [Regular Rhythm] : with a regular rhythm [Normal Bowel Sounds] : normal bowel sounds [Not Distended] : not distended [Soft] : abdomen soft [de-identified] : right lower extremity pitting edema 2+

## 2023-06-23 NOTE — HISTORY OF PRESENT ILLNESS
[FreeTextEntry1] : The pt is a 72 year old female accompanied by her daughter with c/o swelling of her right lower extremity and hypothyroidism. She notes the onset as being fall 2022. Patient has followed up w/vascular and the work-up was negative for DVT. According to Vascular Surgery she was diagnosed w/lymphadema and was referred to PT. She states that the PT does not believe her condition is lymphadema. On 6/13/23 patient had thyroid labs drawn which were abnormal but she had been out of her 88mcg Unithroid for a week prior to the labs.

## 2023-06-23 NOTE — ASSESSMENT
[FreeTextEntry1] : #hypothyroid\par - patient states she is currently taking 88mcg unithroid \par - on 6/13/23 she had labs drawn but she had not taken thyroid medication for a week prior to that \par \par \par #right lower extremity swelling \par - patient received work-up for DVT via vascular surgery in May 2023 \par - the work up was negative and patient was diagnosed w/lymphedema

## 2023-06-26 DIAGNOSIS — E78.00 PURE HYPERCHOLESTEROLEMIA, UNSPECIFIED: ICD-10-CM

## 2023-06-26 DIAGNOSIS — E03.9 HYPOTHYROIDISM, UNSPECIFIED: ICD-10-CM

## 2023-11-03 ENCOUNTER — APPOINTMENT (OUTPATIENT)
Dept: RHEUMATOLOGY | Facility: CLINIC | Age: 73
End: 2023-11-03
Payer: COMMERCIAL

## 2023-11-03 ENCOUNTER — OUTPATIENT (OUTPATIENT)
Dept: OUTPATIENT SERVICES | Facility: HOSPITAL | Age: 73
LOS: 1 days | End: 2023-11-03
Payer: COMMERCIAL

## 2023-11-03 VITALS
WEIGHT: 163 LBS | TEMPERATURE: 97.3 F | HEIGHT: 58 IN | DIASTOLIC BLOOD PRESSURE: 76 MMHG | SYSTOLIC BLOOD PRESSURE: 129 MMHG | OXYGEN SATURATION: 98 % | HEART RATE: 64 BPM | BODY MASS INDEX: 34.22 KG/M2

## 2023-11-03 DIAGNOSIS — Z00.00 ENCOUNTER FOR GENERAL ADULT MEDICAL EXAMINATION WITHOUT ABNORMAL FINDINGS: ICD-10-CM

## 2023-11-03 PROCEDURE — 99204 OFFICE O/P NEW MOD 45 MIN: CPT

## 2023-11-03 RX ORDER — DULOXETINE HYDROCHLORIDE 30 MG/1
30 CAPSULE, DELAYED RELEASE PELLETS ORAL
Qty: 30 | Refills: 3 | Status: ACTIVE | COMMUNITY
Start: 2023-11-03 | End: 1900-01-01

## 2023-11-09 DIAGNOSIS — M25.561 PAIN IN RIGHT KNEE: ICD-10-CM

## 2023-11-09 DIAGNOSIS — M79.89 OTHER SPECIFIED SOFT TISSUE DISORDERS: ICD-10-CM

## 2023-11-16 ENCOUNTER — OUTPATIENT (OUTPATIENT)
Dept: OUTPATIENT SERVICES | Facility: HOSPITAL | Age: 73
LOS: 1 days | End: 2023-11-16
Payer: COMMERCIAL

## 2023-11-16 DIAGNOSIS — I89.0 LYMPHEDEMA, NOT ELSEWHERE CLASSIFIED: ICD-10-CM

## 2023-11-16 PROCEDURE — 97110 THERAPEUTIC EXERCISES: CPT | Mod: GP

## 2023-11-16 PROCEDURE — 97140 MANUAL THERAPY 1/> REGIONS: CPT | Mod: GP

## 2023-11-17 DIAGNOSIS — I89.0 LYMPHEDEMA, NOT ELSEWHERE CLASSIFIED: ICD-10-CM

## 2023-11-21 ENCOUNTER — OUTPATIENT (OUTPATIENT)
Dept: OUTPATIENT SERVICES | Facility: HOSPITAL | Age: 73
LOS: 1 days | End: 2023-11-21

## 2023-11-21 DIAGNOSIS — I89.0 LYMPHEDEMA, NOT ELSEWHERE CLASSIFIED: ICD-10-CM

## 2023-11-30 ENCOUNTER — OUTPATIENT (OUTPATIENT)
Dept: OUTPATIENT SERVICES | Facility: HOSPITAL | Age: 73
LOS: 1 days | End: 2023-11-30

## 2023-11-30 DIAGNOSIS — I89.0 LYMPHEDEMA, NOT ELSEWHERE CLASSIFIED: ICD-10-CM

## 2023-12-05 ENCOUNTER — APPOINTMENT (OUTPATIENT)
Dept: VASCULAR SURGERY | Facility: CLINIC | Age: 73
End: 2023-12-05
Payer: COMMERCIAL

## 2023-12-05 VITALS — HEIGHT: 58 IN | WEIGHT: 164 LBS | BODY MASS INDEX: 34.43 KG/M2

## 2023-12-05 VITALS — HEART RATE: 83 BPM | SYSTOLIC BLOOD PRESSURE: 149 MMHG | DIASTOLIC BLOOD PRESSURE: 73 MMHG

## 2023-12-05 DIAGNOSIS — I89.0 LYMPHEDEMA, NOT ELSEWHERE CLASSIFIED: ICD-10-CM

## 2023-12-05 PROCEDURE — 99212 OFFICE O/P EST SF 10 MIN: CPT

## 2023-12-14 ENCOUNTER — OUTPATIENT (OUTPATIENT)
Dept: OUTPATIENT SERVICES | Facility: HOSPITAL | Age: 73
LOS: 1 days | End: 2023-12-14
Payer: COMMERCIAL

## 2023-12-14 DIAGNOSIS — I89.0 LYMPHEDEMA, NOT ELSEWHERE CLASSIFIED: ICD-10-CM

## 2023-12-14 PROCEDURE — 97110 THERAPEUTIC EXERCISES: CPT | Mod: GP

## 2023-12-14 PROCEDURE — 97140 MANUAL THERAPY 1/> REGIONS: CPT | Mod: GP

## 2023-12-15 DIAGNOSIS — I89.0 LYMPHEDEMA, NOT ELSEWHERE CLASSIFIED: ICD-10-CM

## 2023-12-26 ENCOUNTER — OUTPATIENT (OUTPATIENT)
Dept: OUTPATIENT SERVICES | Facility: HOSPITAL | Age: 73
LOS: 1 days | End: 2023-12-26

## 2023-12-26 DIAGNOSIS — I89.0 LYMPHEDEMA, NOT ELSEWHERE CLASSIFIED: ICD-10-CM

## 2024-01-23 ENCOUNTER — OUTPATIENT (OUTPATIENT)
Dept: OUTPATIENT SERVICES | Facility: HOSPITAL | Age: 74
LOS: 1 days | End: 2024-01-23
Payer: COMMERCIAL

## 2024-01-23 DIAGNOSIS — I89.0 LYMPHEDEMA, NOT ELSEWHERE CLASSIFIED: ICD-10-CM

## 2024-01-23 PROCEDURE — 97110 THERAPEUTIC EXERCISES: CPT | Mod: GP

## 2024-01-23 PROCEDURE — 97140 MANUAL THERAPY 1/> REGIONS: CPT | Mod: GP

## 2024-01-24 DIAGNOSIS — I89.0 LYMPHEDEMA, NOT ELSEWHERE CLASSIFIED: ICD-10-CM

## 2024-03-12 ENCOUNTER — APPOINTMENT (OUTPATIENT)
Dept: VASCULAR SURGERY | Facility: CLINIC | Age: 74
End: 2024-03-12

## 2024-06-03 DIAGNOSIS — G89.29 PAIN IN RIGHT KNEE: ICD-10-CM

## 2024-06-03 DIAGNOSIS — M25.562 PAIN IN RIGHT KNEE: ICD-10-CM

## 2024-06-03 DIAGNOSIS — M79.89 OTHER SPECIFIED SOFT TISSUE DISORDERS: ICD-10-CM

## 2024-06-03 DIAGNOSIS — M25.561 PAIN IN RIGHT KNEE: ICD-10-CM

## 2024-06-08 ENCOUNTER — RESULT REVIEW (OUTPATIENT)
Age: 74
End: 2024-06-08

## 2024-06-08 ENCOUNTER — LABORATORY RESULT (OUTPATIENT)
Age: 74
End: 2024-06-08

## 2024-06-08 ENCOUNTER — OUTPATIENT (OUTPATIENT)
Dept: OUTPATIENT SERVICES | Facility: HOSPITAL | Age: 74
LOS: 1 days | End: 2024-06-08
Payer: MEDICARE

## 2024-06-08 DIAGNOSIS — Z13.820 ENCOUNTER FOR SCREENING FOR OSTEOPOROSIS: ICD-10-CM

## 2024-06-08 DIAGNOSIS — M25.561 PAIN IN RIGHT KNEE: ICD-10-CM

## 2024-06-08 DIAGNOSIS — Z00.8 ENCOUNTER FOR OTHER GENERAL EXAMINATION: ICD-10-CM

## 2024-06-08 PROCEDURE — 73562 X-RAY EXAM OF KNEE 3: CPT | Mod: 26,50

## 2024-06-08 PROCEDURE — 73562 X-RAY EXAM OF KNEE 3: CPT | Mod: 50

## 2024-06-08 PROCEDURE — 77080 DXA BONE DENSITY AXIAL: CPT

## 2024-06-08 PROCEDURE — 77080 DXA BONE DENSITY AXIAL: CPT | Mod: 26

## 2024-06-09 DIAGNOSIS — Z13.820 ENCOUNTER FOR SCREENING FOR OSTEOPOROSIS: ICD-10-CM

## 2024-06-09 DIAGNOSIS — M25.561 PAIN IN RIGHT KNEE: ICD-10-CM

## 2024-06-11 LAB
ALBUMIN SERPL ELPH-MCNC: 4.4 G/DL
ALP BLD-CCNC: 82 U/L
ALT SERPL-CCNC: 18 U/L
ANA SER IF-ACNC: NEGATIVE
ANION GAP SERPL CALC-SCNC: 13 MMOL/L
APPEARANCE: CLEAR
AST SERPL-CCNC: 16 U/L
B2 GLYCOPROT1 IGG SER-ACNC: 8.4 SGU
B2 GLYCOPROT1 IGM SER-ACNC: <5 SMU
BACTERIA: NEGATIVE /HPF
BASOPHILS # BLD AUTO: 0.02 K/UL
BASOPHILS NFR BLD AUTO: 0.4 %
BILIRUB SERPL-MCNC: 0.5 MG/DL
BILIRUBIN URINE: NEGATIVE
BLOOD URINE: NEGATIVE
BUN SERPL-MCNC: 11 MG/DL
C3 SERPL-MCNC: 93 MG/DL
C4 SERPL-MCNC: 17 MG/DL
CALCIUM SERPL-MCNC: 9.7 MG/DL
CARDIOLIPIN IGM SER-MCNC: 8.2 MPL
CARDIOLIPIN IGM SER-MCNC: <5 GPL
CAST: 0 /LPF
CHLORIDE SERPL-SCNC: 98 MMOL/L
CK SERPL-CCNC: 89 U/L
CO2 SERPL-SCNC: 29 MMOL/L
COLOR: YELLOW
CREAT SERPL-MCNC: 0.6 MG/DL
CREAT SPEC-SCNC: 66 MG/DL
CREAT/PROT UR: 0.1 RATIO
CRP SERPL-MCNC: <3 MG/L
EGFR: 94 ML/MIN/1.73M2
EOSINOPHIL # BLD AUTO: 0.14 K/UL
EOSINOPHIL NFR BLD AUTO: 3.1 %
EPITHELIAL CELLS: 1 /HPF
ERYTHROCYTE [SEDIMENTATION RATE] IN BLOOD BY WESTERGREN METHOD: 7 MM/HR
GLUCOSE QUALITATIVE U: NEGATIVE MG/DL
GLUCOSE SERPL-MCNC: 102 MG/DL
HCT VFR BLD CALC: 38.5 %
HGB BLD-MCNC: 12.9 G/DL
IMM GRANULOCYTES NFR BLD AUTO: 0.2 %
KETONES URINE: NEGATIVE MG/DL
LEUKOCYTE ESTERASE URINE: NEGATIVE
LYMPHOCYTES # BLD AUTO: 1.69 K/UL
LYMPHOCYTES NFR BLD AUTO: 37.9 %
MAN DIFF?: NORMAL
MCHC RBC-ENTMCNC: 29.7 PG
MCHC RBC-ENTMCNC: 33.5 G/DL
MCV RBC AUTO: 88.5 FL
MICROSCOPIC-UA: NORMAL
MONOCYTES # BLD AUTO: 0.32 K/UL
MONOCYTES NFR BLD AUTO: 7.2 %
NEUTROPHILS # BLD AUTO: 2.28 K/UL
NEUTROPHILS NFR BLD AUTO: 51.2 %
NITRITE URINE: NEGATIVE
PH URINE: 7.5
PLATELET # BLD AUTO: 200 K/UL
PMV BLD AUTO: 0 /100 WBCS
POTASSIUM SERPL-SCNC: 3.2 MMOL/L
PROT SERPL-MCNC: 6.8 G/DL
PROT UR-MCNC: 5 MG/DLG/24H
PROTEIN URINE: NEGATIVE MG/DL
RBC # BLD: 4.35 M/UL
RBC # FLD: 13.7 %
RED BLOOD CELLS URINE: 2 /HPF
RHEUMATOID FACT SER QL: <10 IU/ML
SODIUM SERPL-SCNC: 140 MMOL/L
SPECIFIC GRAVITY URINE: 1.01
T4 FREE SERPL-MCNC: 1.7 NG/DL
TSH SERPL-ACNC: 0.35 UIU/ML
UROBILINOGEN URINE: 0.2 MG/DL
WBC # FLD AUTO: 4.46 K/UL
WHITE BLOOD CELLS URINE: 0 /HPF

## 2024-06-13 LAB
ALBUMIN MFR SERPL ELPH: 58.9 %
ALBUMIN SERPL-MCNC: 4.1 G/DL
ALBUMIN/GLOB SERPL: 1.5 RATIO
ALPHA1 GLOB MFR SERPL ELPH: 3.7 %
ALPHA1 GLOB SERPL ELPH-MCNC: 0.3 G/DL
ALPHA2 GLOB MFR SERPL ELPH: 9.2 %
ALPHA2 GLOB SERPL ELPH-MCNC: 0.6 G/DL
B-GLOBULIN MFR SERPL ELPH: 10.1 %
B-GLOBULIN SERPL ELPH-MCNC: 0.7 G/DL
CCP AB SER IA-ACNC: <8 UNITS
CENTROMERE IGG SER-ACNC: <0.2 AL
DSDNA AB SER-ACNC: 5 IU/ML
ENA RNP AB SER IA-ACNC: <0.2 AL
ENA SCL70 IGG SER IA-ACNC: 0.6 AL
ENA SM AB SER IA-ACNC: <0.2 AL
ENA SS-A AB SER IA-ACNC: <0.2 AL
ENA SS-B AB SER IA-ACNC: <0.2 AL
GAMMA GLOB FLD ELPH-MCNC: 1.2 G/DL
GAMMA GLOB MFR SERPL ELPH: 18.1 %
INTERPRETATION SERPL IEP-IMP: NORMAL
M PROTEIN MFR SERPL ELPH: 5.6 %
M PROTEIN SPEC IFE-MCNC: NORMAL
MONOCLON BAND OBS SERPL: 0.4 G/DL
PROT SERPL-MCNC: 6.9 G/DL
PROT SERPL-MCNC: 6.9 G/DL
RF+CCP IGG SER-IMP: NEGATIVE
RNA POLYMERASE III IGG: 8 UNITS

## 2024-06-19 NOTE — ASU PATIENT PROFILE, ADULT - FALL HARM RISK - UNIVERSAL INTERVENTIONS
See Attending Note
Bed in lowest position, wheels locked, appropriate side rails in place/Call bell, personal items and telephone in reach/Instruct patient to call for assistance before getting out of bed or chair/Non-slip footwear when patient is out of bed/[place] to call system/Physically safe environment - no spills, clutter or unnecessary equipment/Purposeful Proactive Rounding/Room/bathroom lighting operational, light cord in reach

## 2024-07-30 ENCOUNTER — APPOINTMENT (OUTPATIENT)
Dept: ORTHOPEDIC SURGERY | Facility: CLINIC | Age: 74
End: 2024-07-30
Payer: MEDICARE

## 2024-07-30 DIAGNOSIS — G89.29 PAIN IN RIGHT KNEE: ICD-10-CM

## 2024-07-30 DIAGNOSIS — I89.0 LYMPHEDEMA, NOT ELSEWHERE CLASSIFIED: ICD-10-CM

## 2024-07-30 DIAGNOSIS — M25.561 PAIN IN RIGHT KNEE: ICD-10-CM

## 2024-07-30 DIAGNOSIS — M25.562 PAIN IN RIGHT KNEE: ICD-10-CM

## 2024-07-30 PROCEDURE — 99203 OFFICE O/P NEW LOW 30 MIN: CPT

## 2024-07-30 NOTE — HISTORY OF PRESENT ILLNESS
[de-identified] : 73-year-old female comes in today for an evaluation of her bilateral lower leg pain.  Pain has been going on now since 2023.  No recent injury or trauma.  History of chronic right leg lymphedema, under the care of follow-up with Dr. Sales vascular and rheumatology.  History of hypertension, asthma.  Patient takes gabapentin.

## 2024-07-30 NOTE — IMAGING
[de-identified] : On examination of right no significant swelling.  She has edema to the right lower extremity, which is chronic for her.  No ecchymosis, no erythema.  Skin is intact, calf appears to be soft.  Left knee no swelling, no ecchymosis, no erythema.  Skin is intact.  No edema to the left lower extremity.  Patient has fairly good motion through knee flexion and extension slight restriction bilaterally crepitus noted.  She denies specific knee pain upon palpation along the joint line.   X-rays reviewed from [facility] radiology left- stable moderate medial compartmental degenerative changes and mild lateral compartmental degenerative changes, stable. No acute fracture or dislocation. Mild joint effusion. Osteopenia. right- stable moderate medial compartmental degenerative changes. No joint effusion. Osteopenia.

## 2024-07-30 NOTE — ASSESSMENT
[FreeTextEntry1] : Patient's complaint today is generalized lower leg burning and pain.  She states the pain is not specifically in her knees at this time, although it was in the past.  We discussed orthopedic wise for her knee cortisone injections are an option although may not give her relief with the pain that she is experiencing now.  She takes gabapentin.  I recommend that she follow-up with vascular and rheumatology for further management.  Will see her back here as needed.

## 2024-09-17 ENCOUNTER — APPOINTMENT (OUTPATIENT)
Dept: RHEUMATOLOGY | Facility: CLINIC | Age: 74
End: 2024-09-17
Payer: MEDICAID

## 2024-09-17 VITALS
OXYGEN SATURATION: 97 % | HEART RATE: 80 BPM | BODY MASS INDEX: 34.43 KG/M2 | HEIGHT: 58 IN | DIASTOLIC BLOOD PRESSURE: 70 MMHG | SYSTOLIC BLOOD PRESSURE: 120 MMHG | TEMPERATURE: 98.3 F | WEIGHT: 164 LBS

## 2024-09-17 DIAGNOSIS — R77.8 OTHER SPECIFIED ABNORMALITIES OF PLASMA PROTEINS: ICD-10-CM

## 2024-09-17 DIAGNOSIS — M25.511 PAIN IN RIGHT SHOULDER: ICD-10-CM

## 2024-09-17 DIAGNOSIS — Z80.9 FAMILY HISTORY OF MALIGNANT NEOPLASM, UNSPECIFIED: ICD-10-CM

## 2024-09-17 PROCEDURE — 99214 OFFICE O/P EST MOD 30 MIN: CPT

## 2024-09-17 RX ORDER — DULOXETINE HYDROCHLORIDE 30 MG/1
30 CAPSULE, DELAYED RELEASE PELLETS ORAL
Qty: 30 | Refills: 1 | Status: ACTIVE | COMMUNITY
Start: 2024-09-17 | End: 1900-01-01

## 2024-09-17 RX ORDER — LOSARTAN POTASSIUM 25 MG/1
25 TABLET, FILM COATED ORAL
Refills: 0 | Status: ACTIVE | COMMUNITY

## 2024-09-17 RX ORDER — CHLORHEXIDINE GLUCONATE 4 %
1000 LIQUID (ML) TOPICAL
Refills: 0 | Status: ACTIVE | COMMUNITY

## 2024-09-17 RX ORDER — ERGOCALCIFEROL 1.25 MG/1
1.25 MG CAPSULE, LIQUID FILLED ORAL
Refills: 0 | Status: ACTIVE | COMMUNITY

## 2024-09-17 NOTE — HISTORY OF PRESENT ILLNESS
[FreeTextEntry1] : After her last visit, pt never received the duloxetine for unclear reasons. She has been taking gabapentin as needed because she is concerned it may exacerbate her RLE edema. Pt saw ortho and was advised to f/u with rheum again as her b/l knee pain was not thought to be due to her knees, but rather to a diffuse LE process? Pt points to her knee joints, anterior and posterior, when asked where her pain is today. + Also more recent R>L shoulder pain. Pt woke up suddenly with this pain and with neck stiffness. She went to urgent care and was told she has a pinched nerve in her neck. She received a steroid injection to her R shoulder but it did not make a significant difference  to her symptoms. She has also been going to PT for her shoulders x 2 months but continues to experience significant pain and stiffness, this is her most bothersome symptom currently.  Previous HPI: Approx 1 year ago, pt developed swelling in her RLE, which improves with elevation and worsens toward the end of the day. She saw vascular and was told she has lymphedema, was referred for PT and advised to wear compression devices with lymphapress pumps, with some improvement in her symptoms. However, her PMD referred her to rheum to see if her RLE edema could be related to an underlying rheumatologic process. Pt's bigger concern today is chronic b/l knee pain, which is constant. She takes Tylenol prn with some improvement. She denies other joint pain. + long-standing dry eyes, pt has to use eye drops. She denies Raynaud's, rashes.  Pt was previously prescribed gabapentin for her knee pain but has only been taking it prn because she thinks it may be worsening her RLE edema.    Physical exam: GEN: AAO woman sitting in chair in NAD SKIN: No rashes MOUTH: Dry mucous membranes, no oral ulcers, normal oral aperture PULM: Clear to auscultation b/l CV: Regular rate and rhythm, no murmurs MSK: Shoulders: + Pain in R>L with ROM b/l, limited abduction in R to 150 degrees, + Elise on R Elbows: Full ROM b/l, no effusions Wrists: Full ROM b/l, no effusions Hands: no synovitis Hips: Full ROM b/l Knees: no effusions, full ROM b/l Ankles: + Soft tissue edema on R, full ROM b/l Feet: + Soft tissue edema on R, no TTP EXT: + RLE edema with induration and erythema in the shin and calf, somewhat improved compared to last visit

## 2024-09-17 NOTE — ASSESSMENT
[FreeTextEntry1] : Shoulder pain: Unclear what is causing pt's symptoms - could be a rotator cuff process? Her exam is suggestive of a shoulder pathology as opposed to c-spine. With only partial improvement with PT x 2 months so far. No improvement with steroid injection. - Referred for MRI R shoulder - Continue PT  knee OA: Suspect that pt's knee pain is most likely related to degenerative changes. s/p b/l knee x-rays in 6/2024 which demonstrated moderate stable OA. - Would avoid any pain medications which can be a/w peripheral edema, including NSAIDs, gabapentin - Start duloxetine 30 mg q day, discussed adverse effects. Pt never received prescription after her last visit  Abnormal SPEP: Pt had SPEP done as part of workup for possible neuropathy as she previously reported burning pain in her LE; had gamma migrating paraprotein, also immunofixation with IgA kappa band - f/u repeat SPEP and immunofixation  f/u in 3 months, will call pt with lab and x-ray results

## 2024-10-08 ENCOUNTER — OUTPATIENT (OUTPATIENT)
Dept: OUTPATIENT SERVICES | Facility: HOSPITAL | Age: 74
LOS: 1 days | End: 2024-10-08
Payer: MEDICAID

## 2024-10-08 ENCOUNTER — RESULT REVIEW (OUTPATIENT)
Age: 74
End: 2024-10-08

## 2024-10-08 DIAGNOSIS — M25.211: ICD-10-CM

## 2024-10-08 PROCEDURE — 73221 MRI JOINT UPR EXTREM W/O DYE: CPT | Mod: 26,RT

## 2024-10-08 PROCEDURE — 73221 MRI JOINT UPR EXTREM W/O DYE: CPT | Mod: RT

## 2024-10-09 DIAGNOSIS — M25.211: ICD-10-CM

## 2024-10-14 ENCOUNTER — NON-APPOINTMENT (OUTPATIENT)
Age: 74
End: 2024-10-14

## 2024-10-14 DIAGNOSIS — M75.100 UNSPECIFIED ROTATOR CUFF TEAR OR RUPTURE OF UNSPECIFIED SHOULDER, NOT SPECIFIED AS TRAUMATIC: ICD-10-CM

## 2024-10-14 RX ORDER — PREGABALIN 50 MG/1
50 CAPSULE ORAL
Qty: 30 | Refills: 1 | Status: ACTIVE | COMMUNITY
Start: 2024-10-14 | End: 1900-01-01

## 2024-11-15 ENCOUNTER — APPOINTMENT (OUTPATIENT)
Age: 74
End: 2024-11-15

## 2024-12-13 ENCOUNTER — OUTPATIENT (OUTPATIENT)
Dept: OUTPATIENT SERVICES | Facility: HOSPITAL | Age: 74
LOS: 1 days | End: 2024-12-13
Payer: MEDICAID

## 2024-12-13 ENCOUNTER — LABORATORY RESULT (OUTPATIENT)
Age: 74
End: 2024-12-13

## 2024-12-13 ENCOUNTER — APPOINTMENT (OUTPATIENT)
Age: 74
End: 2024-12-13
Payer: MEDICAID

## 2024-12-13 VITALS
HEIGHT: 58 IN | TEMPERATURE: 98.4 F | WEIGHT: 169 LBS | SYSTOLIC BLOOD PRESSURE: 149 MMHG | HEART RATE: 66 BPM | OXYGEN SATURATION: 98 % | BODY MASS INDEX: 35.48 KG/M2 | DIASTOLIC BLOOD PRESSURE: 62 MMHG | RESPIRATION RATE: 17 BRPM

## 2024-12-13 VITALS
HEART RATE: 99 BPM | BODY MASS INDEX: 35.48 KG/M2 | TEMPERATURE: 98.43 F | WEIGHT: 169 LBS | SYSTOLIC BLOOD PRESSURE: 149 MMHG | HEIGHT: 58 IN | DIASTOLIC BLOOD PRESSURE: 62 MMHG

## 2024-12-13 DIAGNOSIS — D47.2 MONOCLONAL GAMMOPATHY: ICD-10-CM

## 2024-12-13 DIAGNOSIS — H17.9 UNSPECIFIED CORNEAL SCAR AND OPACITY: ICD-10-CM

## 2024-12-13 DIAGNOSIS — Z63.4 DISAPPEARANCE AND DEATH OF FAMILY MEMBER: ICD-10-CM

## 2024-12-13 DIAGNOSIS — H26.9 UNSPECIFIED CATARACT: ICD-10-CM

## 2024-12-13 DIAGNOSIS — R77.9 ABNORMALITY OF PLASMA PROTEIN, UNSPECIFIED: ICD-10-CM

## 2024-12-13 PROCEDURE — 99204 OFFICE O/P NEW MOD 45 MIN: CPT

## 2024-12-13 PROCEDURE — 84165 PROTEIN E-PHORESIS SERUM: CPT

## 2024-12-13 PROCEDURE — 85027 COMPLETE CBC AUTOMATED: CPT

## 2024-12-13 PROCEDURE — 83521 IG LIGHT CHAINS FREE EACH: CPT

## 2024-12-13 PROCEDURE — 84155 ASSAY OF PROTEIN SERUM: CPT

## 2024-12-13 PROCEDURE — 82784 ASSAY IGA/IGD/IGG/IGM EACH: CPT

## 2024-12-13 PROCEDURE — 86334 IMMUNOFIX E-PHORESIS SERUM: CPT

## 2024-12-13 PROCEDURE — 83615 LACTATE (LD) (LDH) ENZYME: CPT

## 2024-12-13 PROCEDURE — 80053 COMPREHEN METABOLIC PANEL: CPT

## 2024-12-13 SDOH — SOCIAL STABILITY - SOCIAL INSECURITY: DISSAPEARANCE AND DEATH OF FAMILY MEMBER: Z63.4

## 2024-12-14 DIAGNOSIS — R77.9 ABNORMALITY OF PLASMA PROTEIN, UNSPECIFIED: ICD-10-CM

## 2024-12-16 LAB
ALBUMIN SERPL ELPH-MCNC: 4.3 G/DL
ALP BLD-CCNC: 92 U/L
ALT SERPL-CCNC: 17 U/L
ANION GAP SERPL CALC-SCNC: 10 MMOL/L
AST SERPL-CCNC: 17 U/L
BILIRUB SERPL-MCNC: 0.3 MG/DL
BUN SERPL-MCNC: 12 MG/DL
CALCIUM SERPL-MCNC: 9.1 MG/DL
CHLORIDE SERPL-SCNC: 105 MMOL/L
CO2 SERPL-SCNC: 26 MMOL/L
CREAT SERPL-MCNC: 0.6 MG/DL
DEPRECATED KAPPA LC FREE/LAMBDA SER: 1.66 RATIO
EGFR: 94 ML/MIN/1.73M2
GLUCOSE SERPL-MCNC: 111 MG/DL
HCT VFR BLD CALC: 37.2 %
HGB BLD-MCNC: 12.3 G/DL
IGA SER QL IEP: 444 MG/DL
IGG SER QL IEP: 858 MG/DL
IGM SER QL IEP: 84 MG/DL
KAPPA LC CSF-MCNC: 1.16 MG/DL
KAPPA LC SERPL-MCNC: 1.92 MG/DL
LDH SERPL-CCNC: 156
MCHC RBC-ENTMCNC: 29.8 PG
MCHC RBC-ENTMCNC: 33.1 G/DL
MCV RBC AUTO: 90.1 FL
PLATELET # BLD AUTO: 160 K/UL
PMV BLD: 9.9 FL
POTASSIUM SERPL-SCNC: 4.5 MMOL/L
PROT SERPL-MCNC: 6.4 G/DL
RBC # BLD: 4.13 M/UL
RBC # FLD: 14.8 %
SODIUM SERPL-SCNC: 141 MMOL/L
WBC # FLD AUTO: 5.84 K/UL

## 2024-12-18 ENCOUNTER — OUTPATIENT (OUTPATIENT)
Dept: OUTPATIENT SERVICES | Facility: HOSPITAL | Age: 74
LOS: 1 days | End: 2024-12-18
Payer: MEDICAID

## 2024-12-18 ENCOUNTER — LABORATORY RESULT (OUTPATIENT)
Age: 74
End: 2024-12-18

## 2024-12-18 ENCOUNTER — APPOINTMENT (OUTPATIENT)
Age: 74
End: 2024-12-18
Payer: MEDICAID

## 2024-12-18 VITALS
HEIGHT: 58 IN | TEMPERATURE: 98.3 F | DIASTOLIC BLOOD PRESSURE: 78 MMHG | WEIGHT: 169 LBS | HEART RATE: 79 BPM | SYSTOLIC BLOOD PRESSURE: 154 MMHG | BODY MASS INDEX: 35.48 KG/M2

## 2024-12-18 DIAGNOSIS — R77.9 ABNORMALITY OF PLASMA PROTEIN, UNSPECIFIED: ICD-10-CM

## 2024-12-18 PROCEDURE — 88291 CYTO/MOLECULAR REPORT: CPT | Mod: 1L

## 2024-12-18 PROCEDURE — 88185 FLOWCYTOMETRY/TC ADD-ON: CPT

## 2024-12-18 PROCEDURE — 88305 TISSUE EXAM BY PATHOLOGIST: CPT

## 2024-12-18 PROCEDURE — 38222 DX BONE MARROW BX & ASPIR: CPT | Mod: 50

## 2024-12-18 PROCEDURE — 38222 DX BONE MARROW BX & ASPIR: CPT

## 2024-12-18 PROCEDURE — 88342 IMHCHEM/IMCYTCHM 1ST ANTB: CPT

## 2024-12-18 PROCEDURE — 88271 CYTOGENETICS DNA PROBE: CPT

## 2024-12-18 PROCEDURE — 88237 TISSUE CULTURE BONE MARROW: CPT

## 2024-12-18 PROCEDURE — 87205 SMEAR GRAM STAIN: CPT

## 2024-12-18 PROCEDURE — 88311 DECALCIFY TISSUE: CPT

## 2024-12-18 PROCEDURE — 88184 FLOWCYTOMETRY/ TC 1 MARKER: CPT

## 2024-12-18 PROCEDURE — 88264 CHROMOSOME ANALYSIS 20-25: CPT

## 2024-12-18 PROCEDURE — 88275 CYTOGENETICS 100-300: CPT

## 2024-12-18 PROCEDURE — 88280 CHROMOSOME KARYOTYPE STUDY: CPT

## 2024-12-18 PROCEDURE — 88313 SPECIAL STAINS GROUP 2: CPT

## 2024-12-20 LAB
ALBUMIN MFR SERPL ELPH: 62 %
ALBUMIN SERPL-MCNC: 4 G/DL
ALBUMIN/GLOB SERPL: 1.6 RATIO
ALPHA1 GLOB MFR SERPL ELPH: 3 %
ALPHA1 GLOB SERPL ELPH-MCNC: 0.2 G/DL
ALPHA2 GLOB MFR SERPL ELPH: 8.3 %
ALPHA2 GLOB SERPL ELPH-MCNC: 0.5 G/DL
B-GLOBULIN MFR SERPL ELPH: 9.4 %
B-GLOBULIN SERPL ELPH-MCNC: 0.6 G/DL
GAMMA GLOB FLD ELPH-MCNC: 1.1 G/DL
GAMMA GLOB MFR SERPL ELPH: 17.3 %
INTERPRETATION SERPL IEP-IMP: NORMAL
M PROTEIN MFR SERPL ELPH: NORMAL
M PROTEIN SPEC IFE-MCNC: NORMAL
MONOCLON BAND OBS SERPL: NORMAL
PROT SERPL-MCNC: 6.5 G/DL
PROT SERPL-MCNC: 6.5 G/DL

## 2024-12-31 DIAGNOSIS — D49.89 NEOPLASM OF UNSPECIFIED BEHAVIOR OF OTHER SPECIFIED SITES: ICD-10-CM

## 2025-01-02 ENCOUNTER — APPOINTMENT (OUTPATIENT)
Dept: RHEUMATOLOGY | Facility: CLINIC | Age: 75
End: 2025-01-02
Payer: MEDICAID

## 2025-01-02 VITALS
TEMPERATURE: 98.6 F | SYSTOLIC BLOOD PRESSURE: 140 MMHG | WEIGHT: 168 LBS | DIASTOLIC BLOOD PRESSURE: 70 MMHG | HEART RATE: 98 BPM | HEIGHT: 58 IN | OXYGEN SATURATION: 97 % | BODY MASS INDEX: 35.26 KG/M2

## 2025-01-02 PROCEDURE — 99214 OFFICE O/P EST MOD 30 MIN: CPT

## 2025-01-02 RX ORDER — PREGABALIN 25 MG/1
25 CAPSULE ORAL
Qty: 30 | Refills: 2 | Status: ACTIVE | COMMUNITY
Start: 2025-01-02 | End: 1900-01-01

## 2025-01-08 ENCOUNTER — OUTPATIENT (OUTPATIENT)
Dept: OUTPATIENT SERVICES | Facility: HOSPITAL | Age: 75
LOS: 1 days | End: 2025-01-08
Payer: MEDICAID

## 2025-01-08 ENCOUNTER — RESULT REVIEW (OUTPATIENT)
Age: 75
End: 2025-01-08

## 2025-01-08 DIAGNOSIS — D47.2 MONOCLONAL GAMMOPATHY: ICD-10-CM

## 2025-01-08 DIAGNOSIS — D49.89 NEOPLASM OF UNSPECIFIED BEHAVIOR OF OTHER SPECIFIED SITES: ICD-10-CM

## 2025-01-08 LAB — GLUCOSE BLDC GLUCOMTR-MCNC: 95 MG/DL — SIGNIFICANT CHANGE UP (ref 70–99)

## 2025-01-08 PROCEDURE — A9552: CPT

## 2025-01-08 PROCEDURE — 78815 PET IMAGE W/CT SKULL-THIGH: CPT | Mod: 26,PI

## 2025-01-08 PROCEDURE — 78815 PET IMAGE W/CT SKULL-THIGH: CPT | Mod: PI

## 2025-01-08 PROCEDURE — 82962 GLUCOSE BLOOD TEST: CPT

## 2025-01-09 DIAGNOSIS — D47.2 MONOCLONAL GAMMOPATHY: ICD-10-CM

## 2025-01-09 DIAGNOSIS — D49.89 NEOPLASM OF UNSPECIFIED BEHAVIOR OF OTHER SPECIFIED SITES: ICD-10-CM

## 2025-02-12 ENCOUNTER — APPOINTMENT (OUTPATIENT)
Age: 75
End: 2025-02-12

## 2025-02-21 ENCOUNTER — OUTPATIENT (OUTPATIENT)
Dept: OUTPATIENT SERVICES | Facility: HOSPITAL | Age: 75
LOS: 1 days | End: 2025-02-21
Payer: MEDICAID

## 2025-02-21 ENCOUNTER — APPOINTMENT (OUTPATIENT)
Age: 75
End: 2025-02-21

## 2025-02-21 VITALS
SYSTOLIC BLOOD PRESSURE: 156 MMHG | HEART RATE: 94 BPM | BODY MASS INDEX: 36.11 KG/M2 | HEIGHT: 58 IN | WEIGHT: 172 LBS | RESPIRATION RATE: 18 BRPM | DIASTOLIC BLOOD PRESSURE: 72 MMHG | TEMPERATURE: 98 F

## 2025-02-21 DIAGNOSIS — D49.89 NEOPLASM OF UNSPECIFIED BEHAVIOR OF OTHER SPECIFIED SITES: ICD-10-CM

## 2025-02-21 LAB
ALBUMIN SERPL ELPH-MCNC: 4.3 G/DL
ALP BLD-CCNC: 85 U/L
ALT SERPL-CCNC: 14 U/L
ANION GAP SERPL CALC-SCNC: 11 MMOL/L
AST SERPL-CCNC: 14 U/L
AUTO BASOPHILS #: 0.02 K/UL
AUTO BASOPHILS %: 0.4 %
AUTO EOSINOPHILS #: 0.13 K/UL
AUTO EOSINOPHILS %: 2.8 %
AUTO IMMATURE GRANULOCYTES #: 0.01 K/UL
AUTO LYMPHOCYTES #: 1.67 K/UL
AUTO LYMPHOCYTES %: 35.9 %
AUTO MONOCYTES #: 0.4 K/UL
AUTO MONOCYTES %: 8.6 %
AUTO NEUTROPHILS #: 2.42 K/UL
AUTO NEUTROPHILS %: 52.1 %
AUTO NRBC #: 0 K/UL
BILIRUB SERPL-MCNC: 0.4 MG/DL
BUN SERPL-MCNC: 13 MG/DL
CALCIUM SERPL-MCNC: 9.2 MG/DL
CHLORIDE SERPL-SCNC: 105 MMOL/L
CO2 SERPL-SCNC: 25 MMOL/L
CREAT SERPL-MCNC: 0.7 MG/DL
EGFR: 90 ML/MIN/1.73M2
GLUCOSE SERPL-MCNC: 97 MG/DL
HCT VFR BLD CALC: 38.3 %
HGB BLD-MCNC: 12.2 G/DL
IMM GRANULOCYTES NFR BLD AUTO: 0.2 %
LDH SERPL-CCNC: 166
MAN DIFF?: NORMAL
MCHC RBC-ENTMCNC: 29.4 PG
MCHC RBC-ENTMCNC: 31.9 G/DL
MCV RBC AUTO: 92.3 FL
PLATELET # BLD AUTO: 154 K/UL
PMV BLD AUTO: 0 /100 WBCS
PMV BLD: 9.9 FL
POTASSIUM SERPL-SCNC: 3.9 MMOL/L
PROT SERPL-MCNC: 6.5 G/DL
RBC # BLD: 4.15 M/UL
RBC # FLD: 14.2 %
SODIUM SERPL-SCNC: 141 MMOL/L
WBC # FLD AUTO: 4.65 K/UL

## 2025-02-21 PROCEDURE — 80053 COMPREHEN METABOLIC PANEL: CPT

## 2025-02-21 PROCEDURE — 85025 COMPLETE CBC W/AUTO DIFF WBC: CPT

## 2025-02-21 PROCEDURE — 87086 URINE CULTURE/COLONY COUNT: CPT

## 2025-02-21 PROCEDURE — 83615 LACTATE (LD) (LDH) ENZYME: CPT

## 2025-02-21 PROCEDURE — 81001 URINALYSIS AUTO W/SCOPE: CPT

## 2025-02-21 PROCEDURE — 82784 ASSAY IGA/IGD/IGG/IGM EACH: CPT

## 2025-02-21 PROCEDURE — 99213 OFFICE O/P EST LOW 20 MIN: CPT

## 2025-02-22 DIAGNOSIS — D49.89 NEOPLASM OF UNSPECIFIED BEHAVIOR OF OTHER SPECIFIED SITES: ICD-10-CM

## 2025-02-24 ENCOUNTER — LABORATORY RESULT (OUTPATIENT)
Age: 75
End: 2025-02-24

## 2025-02-24 LAB
APPEARANCE: CLEAR
BILIRUBIN URINE: NEGATIVE
BLOOD URINE: NEGATIVE
COLOR: YELLOW
GLUCOSE QUALITATIVE U: NEGATIVE MG/DL
IGA SER QL IEP: 410 MG/DL
IGG SER QL IEP: 861 MG/DL
KETONES URINE: NEGATIVE MG/DL
LEUKOCYTE ESTERASE URINE: ABNORMAL
NITRITE URINE: NEGATIVE
PH URINE: 6
PROTEIN URINE: NEGATIVE MG/DL
SPECIFIC GRAVITY URINE: 1.01
UROBILINOGEN URINE: 0.2 MG/DL

## 2025-02-26 LAB — BACTERIA UR CULT: NORMAL

## 2025-03-13 ENCOUNTER — EMERGENCY (EMERGENCY)
Facility: HOSPITAL | Age: 75
LOS: 0 days | Discharge: HOME | End: 2025-03-13
Attending: STUDENT IN AN ORGANIZED HEALTH CARE EDUCATION/TRAINING PROGRAM
Payer: SELF-PAY

## 2025-03-13 VITALS — TEMPERATURE: 98 F

## 2025-03-13 VITALS
WEIGHT: 160.06 LBS | HEART RATE: 102 BPM | RESPIRATION RATE: 18 BRPM | HEIGHT: 59 IN | OXYGEN SATURATION: 95 % | TEMPERATURE: 101 F | SYSTOLIC BLOOD PRESSURE: 133 MMHG | DIASTOLIC BLOOD PRESSURE: 79 MMHG

## 2025-03-13 DIAGNOSIS — R50.9 FEVER, UNSPECIFIED: ICD-10-CM

## 2025-03-13 DIAGNOSIS — C90.00 MULTIPLE MYELOMA NOT HAVING ACHIEVED REMISSION: ICD-10-CM

## 2025-03-13 DIAGNOSIS — M54.50 LOW BACK PAIN, UNSPECIFIED: ICD-10-CM

## 2025-03-13 DIAGNOSIS — I10 ESSENTIAL (PRIMARY) HYPERTENSION: ICD-10-CM

## 2025-03-13 LAB
ALBUMIN SERPL ELPH-MCNC: 4.5 G/DL — SIGNIFICANT CHANGE UP (ref 3.5–5.2)
ALP SERPL-CCNC: 88 U/L — SIGNIFICANT CHANGE UP (ref 30–115)
ALT FLD-CCNC: 33 U/L — SIGNIFICANT CHANGE UP (ref 0–41)
ANION GAP SERPL CALC-SCNC: 13 MMOL/L — SIGNIFICANT CHANGE UP (ref 7–14)
APPEARANCE UR: CLEAR — SIGNIFICANT CHANGE UP
APTT BLD: 31.4 SEC — SIGNIFICANT CHANGE UP (ref 27–39.2)
AST SERPL-CCNC: 44 U/L — HIGH (ref 0–41)
BASOPHILS # BLD AUTO: 0 K/UL — SIGNIFICANT CHANGE UP (ref 0–0.2)
BASOPHILS NFR BLD AUTO: 0 % — SIGNIFICANT CHANGE UP (ref 0–1)
BILIRUB SERPL-MCNC: 1.3 MG/DL — HIGH (ref 0.2–1.2)
BILIRUB UR-MCNC: NEGATIVE — SIGNIFICANT CHANGE UP
BUN SERPL-MCNC: 14 MG/DL — SIGNIFICANT CHANGE UP (ref 10–20)
CALCIUM SERPL-MCNC: 9 MG/DL — SIGNIFICANT CHANGE UP (ref 8.4–10.5)
CHLORIDE SERPL-SCNC: 95 MMOL/L — LOW (ref 98–110)
CO2 SERPL-SCNC: 26 MMOL/L — SIGNIFICANT CHANGE UP (ref 17–32)
COLOR SPEC: YELLOW — SIGNIFICANT CHANGE UP
CREAT SERPL-MCNC: 0.8 MG/DL — SIGNIFICANT CHANGE UP (ref 0.7–1.5)
DIFF PNL FLD: ABNORMAL
EGFR: 77 ML/MIN/1.73M2 — SIGNIFICANT CHANGE UP
EOSINOPHIL # BLD AUTO: 0 K/UL — SIGNIFICANT CHANGE UP (ref 0–0.7)
EOSINOPHIL NFR BLD AUTO: 0 % — SIGNIFICANT CHANGE UP (ref 0–8)
FLUAV AG NPH QL: SIGNIFICANT CHANGE UP
FLUBV AG NPH QL: SIGNIFICANT CHANGE UP
GAS PNL BLDV: SIGNIFICANT CHANGE UP
GLUCOSE SERPL-MCNC: 170 MG/DL — HIGH (ref 70–99)
GLUCOSE UR QL: NEGATIVE MG/DL — SIGNIFICANT CHANGE UP
HCT VFR BLD CALC: 38.8 % — SIGNIFICANT CHANGE UP (ref 37–47)
HGB BLD-MCNC: 13.4 G/DL — SIGNIFICANT CHANGE UP (ref 12–16)
INR BLD: 1.17 RATIO — SIGNIFICANT CHANGE UP (ref 0.65–1.3)
KETONES UR-MCNC: NEGATIVE MG/DL — SIGNIFICANT CHANGE UP
LACTATE SERPL-SCNC: 2.4 MMOL/L — HIGH (ref 0.7–2)
LEUKOCYTE ESTERASE UR-ACNC: NEGATIVE — SIGNIFICANT CHANGE UP
LYMPHOCYTES # BLD AUTO: 0.12 K/UL — LOW (ref 1.2–3.4)
LYMPHOCYTES # BLD AUTO: 0.9 % — LOW (ref 20.5–51.1)
MCHC RBC-ENTMCNC: 30.7 PG — SIGNIFICANT CHANGE UP (ref 27–31)
MCHC RBC-ENTMCNC: 34.5 G/DL — SIGNIFICANT CHANGE UP (ref 32–37)
MCV RBC AUTO: 88.8 FL — SIGNIFICANT CHANGE UP (ref 81–99)
MONOCYTES # BLD AUTO: 0.23 K/UL — SIGNIFICANT CHANGE UP (ref 0.1–0.6)
MONOCYTES NFR BLD AUTO: 1.7 % — SIGNIFICANT CHANGE UP (ref 1.7–9.3)
NEUTROPHILS # BLD AUTO: 13.05 K/UL — HIGH (ref 1.4–6.5)
NEUTROPHILS NFR BLD AUTO: 93.1 % — HIGH (ref 42.2–75.2)
NITRITE UR-MCNC: NEGATIVE — SIGNIFICANT CHANGE UP
PH UR: 7 — SIGNIFICANT CHANGE UP (ref 5–8)
PLATELET # BLD AUTO: 142 K/UL — SIGNIFICANT CHANGE UP (ref 130–400)
PMV BLD: 10.7 FL — HIGH (ref 7.4–10.4)
POTASSIUM SERPL-MCNC: 3.4 MMOL/L — LOW (ref 3.5–5)
POTASSIUM SERPL-SCNC: 3.4 MMOL/L — LOW (ref 3.5–5)
PROT SERPL-MCNC: 6.6 G/DL — SIGNIFICANT CHANGE UP (ref 6–8)
PROT UR-MCNC: NEGATIVE MG/DL — SIGNIFICANT CHANGE UP
PROTHROM AB SERPL-ACNC: 13.8 SEC — HIGH (ref 9.95–12.87)
RBC # BLD: 4.37 M/UL — SIGNIFICANT CHANGE UP (ref 4.2–5.4)
RBC # FLD: 14 % — SIGNIFICANT CHANGE UP (ref 11.5–14.5)
RSV RNA NPH QL NAA+NON-PROBE: SIGNIFICANT CHANGE UP
SARS-COV-2 RNA SPEC QL NAA+PROBE: SIGNIFICANT CHANGE UP
SODIUM SERPL-SCNC: 134 MMOL/L — LOW (ref 135–146)
SP GR SPEC: 1.01 — SIGNIFICANT CHANGE UP (ref 1–1.03)
UROBILINOGEN FLD QL: 0.2 MG/DL — SIGNIFICANT CHANGE UP (ref 0.2–1)
WBC # BLD: 13.52 K/UL — HIGH (ref 4.8–10.8)
WBC # FLD AUTO: 13.52 K/UL — HIGH (ref 4.8–10.8)

## 2025-03-13 PROCEDURE — 85018 HEMOGLOBIN: CPT

## 2025-03-13 PROCEDURE — 85730 THROMBOPLASTIN TIME PARTIAL: CPT

## 2025-03-13 PROCEDURE — 84132 ASSAY OF SERUM POTASSIUM: CPT

## 2025-03-13 PROCEDURE — 71045 X-RAY EXAM CHEST 1 VIEW: CPT | Mod: 26

## 2025-03-13 PROCEDURE — 82330 ASSAY OF CALCIUM: CPT

## 2025-03-13 PROCEDURE — 87040 BLOOD CULTURE FOR BACTERIA: CPT

## 2025-03-13 PROCEDURE — 99285 EMERGENCY DEPT VISIT HI MDM: CPT

## 2025-03-13 PROCEDURE — 36000 PLACE NEEDLE IN VEIN: CPT | Mod: XU

## 2025-03-13 PROCEDURE — 99285 EMERGENCY DEPT VISIT HI MDM: CPT | Mod: 25

## 2025-03-13 PROCEDURE — 36415 COLL VENOUS BLD VENIPUNCTURE: CPT

## 2025-03-13 PROCEDURE — 0241U: CPT

## 2025-03-13 PROCEDURE — 87150 DNA/RNA AMPLIFIED PROBE: CPT

## 2025-03-13 PROCEDURE — 93005 ELECTROCARDIOGRAM TRACING: CPT

## 2025-03-13 PROCEDURE — 87186 SC STD MICRODIL/AGAR DIL: CPT

## 2025-03-13 PROCEDURE — 85610 PROTHROMBIN TIME: CPT

## 2025-03-13 PROCEDURE — 93010 ELECTROCARDIOGRAM REPORT: CPT

## 2025-03-13 PROCEDURE — 85014 HEMATOCRIT: CPT

## 2025-03-13 PROCEDURE — 85025 COMPLETE CBC W/AUTO DIFF WBC: CPT

## 2025-03-13 PROCEDURE — 81001 URINALYSIS AUTO W/SCOPE: CPT

## 2025-03-13 PROCEDURE — 84295 ASSAY OF SERUM SODIUM: CPT

## 2025-03-13 PROCEDURE — 83605 ASSAY OF LACTIC ACID: CPT

## 2025-03-13 PROCEDURE — 87077 CULTURE AEROBIC IDENTIFY: CPT

## 2025-03-13 PROCEDURE — 71045 X-RAY EXAM CHEST 1 VIEW: CPT

## 2025-03-13 PROCEDURE — 82803 BLOOD GASES ANY COMBINATION: CPT

## 2025-03-13 PROCEDURE — 74177 CT ABD & PELVIS W/CONTRAST: CPT | Mod: MC

## 2025-03-13 PROCEDURE — 74177 CT ABD & PELVIS W/CONTRAST: CPT | Mod: 26

## 2025-03-13 PROCEDURE — 80053 COMPREHEN METABOLIC PANEL: CPT

## 2025-03-13 RX ORDER — ACETAMINOPHEN 500 MG/5ML
650 LIQUID (ML) ORAL ONCE
Refills: 0 | Status: COMPLETED | OUTPATIENT
Start: 2025-03-13 | End: 2025-03-13

## 2025-03-13 RX ORDER — SODIUM CHLORIDE 9 G/1000ML
1000 INJECTION, SOLUTION INTRAVENOUS ONCE
Refills: 0 | Status: COMPLETED | OUTPATIENT
Start: 2025-03-13 | End: 2025-03-13

## 2025-03-13 RX ADMIN — Medication 650 MILLIGRAM(S): at 09:57

## 2025-03-13 RX ADMIN — SODIUM CHLORIDE 1000 MILLILITER(S): 9 INJECTION, SOLUTION INTRAVENOUS at 09:57

## 2025-03-13 NOTE — ED PROVIDER NOTE - NSFOLLOWUPINSTRUCTIONS_ED_ALL_ED_FT
Follow up with Dr. Small and Dr. Vázquez in 1 week.  Return to ED if symptoms worsen.    Multiple Myeloma  The skeleton with a close-up of a cross-section of the femur showing bone marrow.   Multiple myeloma is a form of cancer. It develops when abnormal plasma cells grow out of control. Plasma cells are a type of white blood cell that is made in the soft tissue inside the bones (bone marrow). They are part of the body's disease-fighting system (immune system).    Multiple myeloma damages bones and causes other health problems because of its effect on blood cells. Abnormal plasma cells produce monoclonal proteins (M proteins) and interfere with many important functions that normal cells perform in the body. The disease gets worse over time and reduces the body's ability to fight infections.    What are the causes?  The cause of multiple myeloma is not known.    What increases the risk?  You are more likely to develop this condition if you:  Are older than age 65.  Are male.  Are .  Have a family history of multiple myeloma.  Have a history of radiation exposure.  Have been exposed to certain chemicals, such as benzene or pesticides.  What are the signs or symptoms?  Symptoms of this condition include:  Bone pain, especially in the back, ribs, and hips.  Broken bones (fractures).  Having a low level of red blood cells, white blood cells, and platelets.  Fatigue.  Frequent infections.  Confusion.  Weakness or numbness in your legs or sudden, severe back pain.  Other symptoms may include:  High blood calcium levels.  Shortness of breath.  Increased urination.  Unusual bleeding, such as:  Bleeding from the nose or gums.  Bleeding a lot from a small scrape or cut.  How is this diagnosed?  This condition is diagnosed based on your symptoms, your medical history, and a physical exam. You will have blood and urine tests to confirm that M proteins are present.    You may also have other tests, including:  Additional blood tests.  Tests to check kidney function.  Heart tests, such as an echocardiogram. An echocardiogram uses sound waves to produce an image of the heart.  A procedure to remove a sample of bone marrow (bone marrow biopsy). The sample is examined for abnormal plasma cells.  Other diagnostic tests may include:  X-rays.  CT scan.  MRI.  PET scan.  How is this treated?  There is no cure for multiple myeloma. However, treatments can manage symptoms and slow the progression of the disease. Treatment options may vary depending on how much the disease has advanced. Possible treatment options may include:  Drug therapy:  Medicines that kill cancer cells (chemotherapy).  Medicines that block the growth and spread of cancer cells (targeted drug therapy).  Medicines that strengthen your immune system's ability to fight cancer cells (immunotherapy or biologic therapy).  Medicines that help to prevent bone damage (bisphosphonates).  Medicines that reduce swelling (corticosteroids).  Other medicines to treat problems such as infections or pain.  Radiation therapy:  This is the use of high-energy rays to kill cancer cells.  Surgery:  A bone marrow transplant to replace diseased bone marrow with healthy bone marrow (stem cell transplant).  Surgery to repair bone damage.  You may be asked to participate in clinical trials to determine if new (experimental) treatments are effective.    Follow these instructions at home:  Eating and drinking    A comparison of three sample cups showing dark yellow, yellow, and pale yellow urine.  Drink enough fluid to keep your urine pale yellow.  Try to eat healthy meals regularly. Some of your treatments might affect your appetite. If you are having problems eating or if you do not have an appetite, meet with a diet and nutrition specialist (dietitian).  Take vitamins or supplements only as told by your health care provider or dietitian. Some vitamins and supplements may interfere with how well your treatment works.  General instructions    Take over-the-counter and prescription medicines only as told by your health care provider.  Stay active. Talk with your health care provider about what types of exercises and activities are safe for you.  Avoid activities that cause increased pain.  You may have to avoid lifting. Ask your health care provider how much you can safely lift.  Consider joining a support group or getting counseling to help you cope with the stress of having multiple myeloma.  Keep all follow-up visits. Your health care provider will want you to continue regular blood tests and check if further treatment is needed.  Where to find more information  American Cancer Society: www.cancer.org  Leukemia and Lymphoma Society: www.lls.org  National Cancer [place] (NCI): www.cancer.gov  Contact a health care provider if:  You have pain that gets worse or does not get better with medicine.  You have a fever.  You have swollen legs.  You have weakness or dizziness.  You have changes in urination or bowel movements.  You have blood in your urine or stool (feces).  You feel depressed.  You have a cough or symptoms of the common cold.  You have unexplained weight loss, bleeding, or bruising.  Get help right away if:  You have sudden severe pain, especially back pain.  You have numbness or weakness in your arms, hands, legs, or feet.  You become very confused or have trouble staying awake.  You have weakness on one side of your body.  You have slurred speech.  You have shortness of breath.  You vomit blood or cough up blood.  These symptoms may be an emergency. Get help right away. Call 911.  Do not wait to see if the symptoms will go away.  Do not drive yourself to the hospital.  Summary  Multiple myeloma is a form of cancer. It develops when abnormal plasma cells grow out of control.  There is no cure for multiple myeloma. However, treatments can manage symptoms and slow the progression of the disease.  Stay active. Talk with your health care provider about what types of exercises and activities are safe for you.  Get help right away if you have weakness on one side of your body or slurred speech.  This information is not intended to replace advice given to you by your health care provider. Make sure you discuss any questions you have with your health care provider.

## 2025-03-13 NOTE — ED PROVIDER NOTE - PATIENT PORTAL LINK FT
You can access the FollowMyHealth Patient Portal offered by [facility] by registering at the following website: [email or web address]. By joining [facility]’s FollowMyHealth portal, you will also be able to view your health information using other applications (apps) compatible with our system.

## 2025-03-13 NOTE — ED ADULT TRIAGE NOTE - CHIEF COMPLAINT QUOTE
Presented to ED c/o lower back pain x2 weeks worsening last night and developed fever since last night. Took 3 tabs motrin at 0730am today.

## 2025-03-13 NOTE — ED ADULT NURSE NOTE - NSFALLRISKINTERV_ED_ALL_ED
Assistance OOB with selected safe patient handling equipment if applicable/Assistance with ambulation/Communicate fall risk and risk factors to all staff, patient, and family/Monitor gait and stability/Provide visual cue: yellow wristband, yellow gown, etc/Reinforce activity limits and safety measures with patient and family/Call bell, personal items and telephone in reach/Instruct patient to call for assistance before getting out of bed/chair/stretcher/Non-slip footwear applied when patient is off stretcher/[place] to call system/Physically safe environment - no spills, clutter or unnecessary equipment/Purposeful Proactive Rounding/Room/bathroom lighting operational, light cord in reach

## 2025-03-13 NOTE — ED PROVIDER NOTE - CARE PROVIDER_API CALL
Jairo Small  Internal Medicine  TERESA DIEHL, Phys,    Phone: [phone]  Fax: [phone]  Established Patient  Follow Up Time:

## 2025-03-13 NOTE — ED PROVIDER NOTE - PHYSICAL EXAMINATION
VITAL SIGNS: I have reviewed nursing notes and confirm.  CONSTITUTIONAL: well-appearing, non-toxic, NAD  SKIN: Warm dry, normal skin turgor, no ecchymosis, no significant signs of trauma  HEAD: NCAT  NECK: Supple; non tender  CARD: RRR, no murmurs, rubs or gallops  RESP: clear to ausculation b/l.  No rales, rhonchi, or wheezing.  ABD: soft,  non-tender, non-distended, no rebound or guarding.   EXT: Full ROM, no bony tenderness  NEURO: AxOx2, answering questions, drowsy, pupils +2 bilat, sluggish, no focal deficits.  PSYCH: Cooperative, appropriate. VITAL SIGNS: I have reviewed nursing notes and confirm.  CONSTITUTIONAL: well-appearing, non-toxic, NAD  SKIN: Warm dry, normal skin turgor, no rash  HEAD: NCAT  NECK: Supple  CARD: RRR, no murmurs, rubs or gallops  RESP: clear to ausculation b/l.  No rales, rhonchi, or wheezing.  ABD: soft, non-tender, non-distended, no rebound or guarding.   EXT: Full ROM, no bony tenderness  NEURO: normal motor. normal sensory.  PSYCH: Cooperative, appropriate.  MSK: + tenderness to palpation to lumbar paraspinal area. No step offs of deformities.

## 2025-03-13 NOTE — ED ADULT NURSE NOTE - OBJECTIVE STATEMENT
pt presented to ED c/o lower back pain x2 weeks worsening last night and developed fever since last night. denies any  symptoms, pt Took 3 tabs motrin at 0730am today.

## 2025-03-13 NOTE — ED PROVIDER NOTE - CLINICAL SUMMARY MEDICAL DECISION MAKING FREE TEXT BOX
pt with MSK back pain, atraumatic, worse with movement and positioning, CTs negative, pt with fever here,     no hyperviscosity, CT negative    Pt feeling improved, tolerating PO, abdomen soft, non-tender, non-distended, no rebound, no guarding.    Appropriate medications for patient's presenting complaints were ordered and effects were reassessed. Patient's external records were reviewed    Escalation to admission and/or observation was considered.  Patient feels much better and is comfortable with discharge.  Appropriate follow-up was arranged.

## 2025-03-13 NOTE — ED PROVIDER NOTE - OBJECTIVE STATEMENT
59-year-old male with past medical history of BPH, hypercholesterolemia, diabetes, hypothyroidism, CKD, presents to the ED due to fall from standing prior to arrival.  No trauma to head, no LOC, no anticoaculation unable to ambulate after event happened.  Per wife, patient has been falling for the last week.  Mentation fluctuates between fully alert and sometimes drowsy.  Denies any recent fever, cough, chest pain, shortness of breath or other complaints. 74yoF w. PMH of hypertension and MM ( Dr. Vázquez), presents to ED due to atraumatic back pain x 1 week. Pt. states pain is to lower back, worse w. movement and positioning. Also c/o fever that is responsive to antipyretics. Pt. Denies cough, chest pain, SOB, abd pain or other complaints.    Pt. denies urinary frequency, incontinence, dysuria or hematuria.

## 2025-03-14 ENCOUNTER — INPATIENT (INPATIENT)
Facility: HOSPITAL | Age: 75
LOS: 2 days | Discharge: HOME | DRG: 872 | End: 2025-03-17
Attending: INTERNAL MEDICINE | Admitting: STUDENT IN AN ORGANIZED HEALTH CARE EDUCATION/TRAINING PROGRAM
Payer: SELF-PAY

## 2025-03-14 VITALS
HEART RATE: 77 BPM | DIASTOLIC BLOOD PRESSURE: 76 MMHG | HEIGHT: 59 IN | RESPIRATION RATE: 18 BRPM | WEIGHT: 164.91 LBS | SYSTOLIC BLOOD PRESSURE: 143 MMHG | TEMPERATURE: 99 F | OXYGEN SATURATION: 97 %

## 2025-03-14 DIAGNOSIS — R78.81 BACTEREMIA: ICD-10-CM

## 2025-03-14 LAB
ALBUMIN SERPL ELPH-MCNC: 3.9 G/DL — SIGNIFICANT CHANGE UP (ref 3.5–5.2)
ALT FLD-CCNC: 46 U/L — HIGH (ref 0–41)
ANION GAP SERPL CALC-SCNC: 10 MMOL/L — SIGNIFICANT CHANGE UP (ref 7–14)
ANISOCYTOSIS BLD QL: SLIGHT — SIGNIFICANT CHANGE UP
AST SERPL-CCNC: 47 U/L — HIGH (ref 0–41)
BASOPHILS # BLD AUTO: 0 K/UL — SIGNIFICANT CHANGE UP (ref 0–0.2)
BASOPHILS NFR BLD AUTO: 0 % — SIGNIFICANT CHANGE UP (ref 0–1)
BILIRUB SERPL-MCNC: 0.3 MG/DL — SIGNIFICANT CHANGE UP (ref 0.2–1.2)
BUN SERPL-MCNC: 20 MG/DL — SIGNIFICANT CHANGE UP (ref 10–20)
BURR CELLS BLD QL SMEAR: PRESENT — SIGNIFICANT CHANGE UP
CALCIUM SERPL-MCNC: 9.1 MG/DL — SIGNIFICANT CHANGE UP (ref 8.4–10.5)
CHLORIDE SERPL-SCNC: 101 MMOL/L — SIGNIFICANT CHANGE UP (ref 98–110)
CO2 SERPL-SCNC: 27 MMOL/L — SIGNIFICANT CHANGE UP (ref 17–32)
CREAT SERPL-MCNC: 0.7 MG/DL — SIGNIFICANT CHANGE UP (ref 0.7–1.5)
EGFR: 90 ML/MIN/1.73M2 — SIGNIFICANT CHANGE UP
EGFR: 90 ML/MIN/1.73M2 — SIGNIFICANT CHANGE UP
EOSINOPHIL # BLD AUTO: 0 K/UL — SIGNIFICANT CHANGE UP (ref 0–0.7)
EOSINOPHIL NFR BLD AUTO: 0 % — SIGNIFICANT CHANGE UP (ref 0–8)
GLUCOSE SERPL-MCNC: 118 MG/DL — HIGH (ref 70–99)
GRAM STN FLD: ABNORMAL
HCT VFR BLD CALC: 38.3 % — SIGNIFICANT CHANGE UP (ref 37–47)
HGB BLD-MCNC: 12.6 G/DL — SIGNIFICANT CHANGE UP (ref 12–16)
LYMPHOCYTES # BLD AUTO: 0.73 K/UL — LOW (ref 1.2–3.4)
LYMPHOCYTES # BLD AUTO: 12.4 % — LOW (ref 20.5–51.1)
MACROCYTES BLD QL: SLIGHT — SIGNIFICANT CHANGE UP
MANUAL SMEAR VERIFICATION: SIGNIFICANT CHANGE UP
MCHC RBC-ENTMCNC: 30 PG — SIGNIFICANT CHANGE UP (ref 27–31)
MCHC RBC-ENTMCNC: 32.9 G/DL — SIGNIFICANT CHANGE UP (ref 32–37)
MCV RBC AUTO: 91.2 FL — SIGNIFICANT CHANGE UP (ref 81–99)
METHOD TYPE: SIGNIFICANT CHANGE UP
MONOCYTES # BLD AUTO: 0.36 K/UL — SIGNIFICANT CHANGE UP (ref 0.1–0.6)
MONOCYTES NFR BLD AUTO: 6.2 % — SIGNIFICANT CHANGE UP (ref 1.7–9.3)
NEUTROPHILS # BLD AUTO: 4.35 K/UL — SIGNIFICANT CHANGE UP (ref 1.4–6.5)
NEUTROPHILS NFR BLD AUTO: 74.3 % — SIGNIFICANT CHANGE UP (ref 42.2–75.2)
OVALOCYTES BLD QL SMEAR: SLIGHT — SIGNIFICANT CHANGE UP
PLAT MORPH BLD: ABNORMAL
PLATELET # BLD AUTO: 135 K/UL — SIGNIFICANT CHANGE UP (ref 130–400)
PMV BLD: 11.3 FL — HIGH (ref 7.4–10.4)
POIKILOCYTOSIS BLD QL AUTO: SLIGHT — SIGNIFICANT CHANGE UP
POTASSIUM SERPL-MCNC: 3.9 MMOL/L — SIGNIFICANT CHANGE UP (ref 3.5–5)
POTASSIUM SERPL-SCNC: 3.9 MMOL/L — SIGNIFICANT CHANGE UP (ref 3.5–5)
PROT SERPL-MCNC: 6.6 G/DL — SIGNIFICANT CHANGE UP (ref 6–8)
RBC # BLD: 4.2 M/UL — SIGNIFICANT CHANGE UP (ref 4.2–5.4)
RBC # FLD: 14.5 % — SIGNIFICANT CHANGE UP (ref 11.5–14.5)
RBC BLD AUTO: ABNORMAL
S LUGDUNENSIS DNA SNV QL NAA+NON-PROBE: SIGNIFICANT CHANGE UP
SMUDGE CELLS # BLD: PRESENT — SIGNIFICANT CHANGE UP
SODIUM SERPL-SCNC: 138 MMOL/L — SIGNIFICANT CHANGE UP (ref 135–146)
SPECIMEN SOURCE: SIGNIFICANT CHANGE UP
VARIANT LYMPHS # BLD: 7.1 % — HIGH (ref 0–5)
VARIANT LYMPHS NFR BLD MANUAL: 7.1 % — HIGH (ref 0–5)
WBC # FLD AUTO: 5.85 K/UL — SIGNIFICANT CHANGE UP (ref 4.8–10.8)

## 2025-03-14 PROCEDURE — 87641 MR-STAPH DNA AMP PROBE: CPT

## 2025-03-14 PROCEDURE — 72158 MRI LUMBAR SPINE W/O & W/DYE: CPT | Mod: MC

## 2025-03-14 PROCEDURE — 83735 ASSAY OF MAGNESIUM: CPT

## 2025-03-14 PROCEDURE — 85025 COMPLETE CBC W/AUTO DIFF WBC: CPT

## 2025-03-14 PROCEDURE — 80202 ASSAY OF VANCOMYCIN: CPT

## 2025-03-14 PROCEDURE — A9579: CPT

## 2025-03-14 PROCEDURE — 72110 X-RAY EXAM L-2 SPINE 4/>VWS: CPT

## 2025-03-14 PROCEDURE — 99285 EMERGENCY DEPT VISIT HI MDM: CPT

## 2025-03-14 PROCEDURE — 86140 C-REACTIVE PROTEIN: CPT

## 2025-03-14 PROCEDURE — 80053 COMPREHEN METABOLIC PANEL: CPT

## 2025-03-14 PROCEDURE — 93306 TTE W/DOPPLER COMPLETE: CPT

## 2025-03-14 PROCEDURE — 83605 ASSAY OF LACTIC ACID: CPT

## 2025-03-14 PROCEDURE — 93970 EXTREMITY STUDY: CPT

## 2025-03-14 PROCEDURE — 72074 X-RAY EXAM THORAC SPINE4/>VW: CPT

## 2025-03-14 PROCEDURE — 87640 STAPH A DNA AMP PROBE: CPT

## 2025-03-14 PROCEDURE — 87040 BLOOD CULTURE FOR BACTERIA: CPT

## 2025-03-14 PROCEDURE — 36415 COLL VENOUS BLD VENIPUNCTURE: CPT

## 2025-03-14 PROCEDURE — 85652 RBC SED RATE AUTOMATED: CPT

## 2025-03-14 PROCEDURE — 76705 ECHO EXAM OF ABDOMEN: CPT

## 2025-03-14 RX ORDER — ENOXAPARIN SODIUM 100 MG/ML
40 INJECTION SUBCUTANEOUS EVERY 24 HOURS
Refills: 0 | Status: DISCONTINUED | OUTPATIENT
Start: 2025-03-14 | End: 2025-03-17

## 2025-03-14 RX ORDER — CEFEPIME 2 G/20ML
2000 INJECTION, POWDER, FOR SOLUTION INTRAVENOUS ONCE
Refills: 0 | Status: COMPLETED | OUTPATIENT
Start: 2025-03-14 | End: 2025-03-14

## 2025-03-14 RX ORDER — VANCOMYCIN HCL IN 5 % DEXTROSE 1.5G/250ML
1000 PLASTIC BAG, INJECTION (ML) INTRAVENOUS ONCE
Refills: 0 | Status: COMPLETED | OUTPATIENT
Start: 2025-03-14 | End: 2025-03-14

## 2025-03-14 RX ORDER — SODIUM CHLORIDE 9 G/1000ML
1000 INJECTION, SOLUTION INTRAVENOUS ONCE
Refills: 0 | Status: COMPLETED | OUTPATIENT
Start: 2025-03-14 | End: 2025-03-14

## 2025-03-14 RX ORDER — POLYETHYLENE GLYCOL 3350 17 G/17G
17 POWDER, FOR SOLUTION ORAL
Refills: 0 | Status: DISCONTINUED | OUTPATIENT
Start: 2025-03-14 | End: 2025-03-17

## 2025-03-14 RX ORDER — SENNA 187 MG
2 TABLET ORAL AT BEDTIME
Refills: 0 | Status: DISCONTINUED | OUTPATIENT
Start: 2025-03-14 | End: 2025-03-17

## 2025-03-14 RX ADMIN — Medication 250 MILLIGRAM(S): at 20:15

## 2025-03-14 RX ADMIN — CEFEPIME 100 MILLIGRAM(S): 2 INJECTION, POWDER, FOR SOLUTION INTRAVENOUS at 19:29

## 2025-03-14 RX ADMIN — CEFEPIME 2000 MILLIGRAM(S): 2 INJECTION, POWDER, FOR SOLUTION INTRAVENOUS at 20:15

## 2025-03-14 RX ADMIN — SODIUM CHLORIDE 1000 MILLILITER(S): 9 INJECTION, SOLUTION INTRAVENOUS at 19:29

## 2025-03-14 RX ADMIN — SODIUM CHLORIDE 1000 MILLILITER(S): 9 INJECTION, SOLUTION INTRAVENOUS at 20:15

## 2025-03-14 NOTE — H&P ADULT - NSHPLABSRESULTS_GEN_ALL_CORE
T(C): 37.1 (03-14-25 @ 18:29), Max: 37.1 (03-14-25 @ 18:29)  HR: 72 (03-14-25 @ 20:51) (72 - 77)  BP: 147/65 (03-14-25 @ 20:51) (143/76 - 149/63)  RR: 18 (03-14-25 @ 18:29) (18 - 18)  SpO2: 97% (03-14-25 @ 18:29) (97% - 97%)    CONSTITUTIONAL: NAD  HEENT: NC/AT, EOMI  NECK: Supple, symmetric and without tracheal deviation   RESP: No respiratory distress, no use of accessory muscles. B/l air entry. No adventitious breath sounds  CARDIOVASCULAR: RRR, +S1S2  EXTREMITIES: No pedal edema b/l  GI: BS (+), Soft, NT, ND, no rebound, no guarding  MSK: Normal muscle strength/tone in UE & LE b/l  NEURO: Sensation intact in upper and lower extremities b/l to light touch   PSYCH: A&Ox3. Appropriate insight & judgement T(C): 37.1 (03-14-25 @ 18:29), Max: 37.1 (03-14-25 @ 18:29)  HR: 72 (03-14-25 @ 20:51) (72 - 77)  BP: 147/65 (03-14-25 @ 20:51) (143/76 - 149/63)  RR: 18 (03-14-25 @ 18:29) (18 - 18)  SpO2: 97% (03-14-25 @ 18:29) (97% - 97%)    CONSTITUTIONAL: NAD  HEENT: NC/AT, EOMI  NECK: Supple, symmetric and without tracheal deviation   RESP: No respiratory distress, no use of accessory muscles. B/l air entry. No adventitious breath sounds  CARDIOVASCULAR: RRR, +S1S2  EXTREMITIES: RLE erythema with 2+ pedal edema  GI: BS (+), Soft, NT, ND, no rebound, no guarding. (-) CVAT.   MSK: Normal muscle strength/tone in UE & LE b/l  NEURO: Sensation intact in upper and lower extremities b/l to light touch   PSYCH: A&Ox3. Appropriate insight & judgement

## 2025-03-14 NOTE — H&P ADULT - NSHPPHYSICALEXAM_GEN_ALL_CORE
T(C): 37.1 (03-14-25 @ 18:29), Max: 37.1 (03-14-25 @ 18:29)  HR: 72 (03-14-25 @ 20:51) (72 - 77)  BP: 147/65 (03-14-25 @ 20:51) (143/76 - 149/63)  RR: 18 (03-14-25 @ 18:29) (18 - 18)  SpO2: 97% (03-14-25 @ 18:29) (97% - 97%)    CONSTITUTIONAL: NAD  HEENT: NC/AT, EOMI  NECK: Supple, symmetric and without tracheal deviation   RESP: No respiratory distress, no use of accessory muscles. B/l air entry. No adventitious breath sounds  CARDIOVASCULAR: RRR, +S1S2  EXTREMITIES: No pedal edema b/l  GI: BS (+), Soft, NT, ND, no rebound, no guarding  MSK: Normal muscle strength/tone in UE & LE b/l  NEURO: Sensation intact in upper and lower extremities b/l to light touch   PSYCH: A&Ox3. Appropriate insight & judgement

## 2025-03-14 NOTE — ED POST DISCHARGE NOTE - DETAILS
spoke with patient with  #[id number] and daughter who speaks english--> Patient was advised of +growth in blood cultures and need to return to ED today

## 2025-03-14 NOTE — ED ADULT TRIAGE NOTE - CHIEF COMPLAINT QUOTE
Sent back to Ed after receiving an abnormal blood culture result. Advised to come back and have repeated. No medical complaints at this time

## 2025-03-14 NOTE — H&P ADULT - HISTORY OF PRESENT ILLNESS
74 year old female, past medical history MM, who presents to ed s/p +blood cultures. patient presented to ed yesterday with fever and back pain, dc pending blood cultures, +blood cultures with gram positive cocci in clusters prompting return to ed. patient has been taking motrin/tylenol. reports cough and back pain. denies chest pain, shortness of breath, vomiting, urinary symptoms, diarrhea.    In ED, T 98.8, /76, HR 77, RR 18, SpO2 97%  Labs: AST 47, ALT 46  VBG: lactate 2.2, pH 7.45, pCO2 38, pO2 58    EKG: NSR  CT A/P w IV Con: Moderate fecal retention. Nonobstructive bowel gas pattern. Colonic   diverticulosis with no radiographic evidence diverticulitis. Mild increase in size with nonspecific lymph node anterior to the right   psoas muscle. Mild increase in size in lymph nodes adjacent to the right   saphenofemoral vessels. Mild haziness within the right inguinal canalof   uncertain clinical significance. No significant hernia.    s/p cefepime 2g, vanc 1g, 1L LR bolus.   Admitted to medicine for further management.          74 year old female, past medical history MM, who presents to ed s/p +blood cultures. patient presented to ed yesterday with fever and back pain, dc pending blood cultures, +blood cultures with gram positive cocci in clusters prompting return to ed. patient has been taking motrin/tylenol. reports back pain localized to mid- lumbosacral spine. denies chest pain, shortness of breath, vomiting, urinary symptoms, diarrhea.    In ED, T 98.8, /76, HR 77, RR 18, SpO2 97%  Labs: AST 47, ALT 46  VBG: lactate 2.2, pH 7.45, pCO2 38, pO2 58    EKG: NSR  CT A/P w IV Con: Moderate fecal retention. Nonobstructive bowel gas pattern. Colonic   diverticulosis with no radiographic evidence diverticulitis. Mild increase in size with nonspecific lymph node anterior to the right   psoas muscle. Mild increase in size in lymph nodes adjacent to the right   saphenofemoral vessels. Mild haziness within the right inguinal canalof   uncertain clinical significance. No significant hernia.    s/p cefepime 2g, vanc 1g, 1L LR bolus.   Admitted to medicine for further management.          74 year old female, past medical history MM, who presents to ed s/p +blood cultures. patient presented to ed yesterday with fever and back pain, dc pending blood cultures, +blood cultures with gram positive cocci in clusters prompting return to ed. patient has been taking motrin/tylenol. reports back pain localized to mid- lumbosacral spine. denies chest pain, shortness of breath, vomiting, urinary symptoms, diarrhea.    In ED, T 98.8, /76, HR 77, RR 18, SpO2 97%  Labs: AST 47, ALT 46  VBG: lactate 2.2, pH 7.45, pCO2 38, pO2 58    EKG: NSR  CT A/P w IV Con: Moderate fecal retention. Nonobstructive bowel gas pattern. Colonic   diverticulosis with no radiographic evidence diverticulitis. Mild increase in size with nonspecific lymph node anterior to the right   psoas muscle. Mild increase in size in lymph nodes adjacent to the right   saphenofemoral vessels. Mild haziness within the right inguinal canalof   uncertain clinical significance. No significant hernia.    s/p cefepime 2g, vanc 1g, 1L LR bolus.   Admitted to medicine for further management.     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

## 2025-03-14 NOTE — ED PROVIDER NOTE - PHYSICAL EXAMINATION
CONSTITUTIONAL: non-toxic   SKIN: skin exam is warm and dry  HEAD: Normocephalic; atraumatic  EYES: injected conjunctiva   ENT: MMM  NECK: ROM intact.  CARD: S1, S2 normal, no murmur  RESP: No wheezes, rales or rhonchi. Good air movement bilaterally  ABD: soft; non-distended; non-tender.   NEURO: awake, alert, following commands, oriented, grossly unremarkable. No Focal deficits. GCS 15.   PSYCH: Cooperative

## 2025-03-14 NOTE — H&P ADULT - ASSESSMENT
74 year old female, past medical history MM, who presents to ed s/p +blood cultures. patient presented to ed yesterday with fever and back pain, dc pending blood cultures, +blood cultures with gram positive cocci in clusters prompting return to ed. patient has been taking motrin/tylenol. reports cough and back pain. denies chest pain, shortness of breath, vomiting, urinary symptoms, diarrhea.    #Fever, Back Pain (No fever here)  # GP Bacteremia  #Moderate Fecal Retention   #H/o Diverticulosis   - In ED, T 98.8, /76, HR 77, RR 18, SpO2 97%  - Labs: AST 47, ALT 46  - VBG: lactate 2.2, pH 7.45, pCO2 38, pO2 58  - EKG: NSR  - CT A/P w IV Con: Moderate fecal retention. Nonobstructive bowel gas pattern. Colonic diverticulosis with no radiographic evidence diverticulitis. Mild increase in size with nonspecific lymph node anterior to the right psoas muscle. Mild increase in size in lymph nodes adjacent to the right saphenofemoral vessels. Mild haziness within the right inguinal canal of uncertain clinical significance. No significant hernia.  - s/p cefepime 2g, vanc 1g, 1L LR bolus.   - XR LS-spine, T-spine  - CT LS-spine   - ESR, CRP  - Trend lactate  - ID consult  - c/w Vanc/Cefepime  - gentle IVF  - bowel regimen     #Transaminitis  - AST 47, ALT 46  - RUQ US  - Monitor LFTs     #H/o MM  - c/w home meds  - OP f/u                                           --------------------------------------------------------------    # DVT prophylaxis: Lovenox  # GI prophylaxis: PPI  # Diet: DASH/TLC  # Activity: IAT with Assistance  # Code status: Full Code  # Disposition:    Pending:    74 year old female, past medical history MM, who presents to ed s/p +blood cultures. patient presented to ed yesterday with fever and back pain, dc pending blood cultures, +blood cultures with gram positive cocci in clusters prompting return to ed. patient has been taking motrin/tylenol. reports cough and back pain. denies chest pain, shortness of breath, vomiting, urinary symptoms, diarrhea.    #Fever, Back Pain (No fever here)  # GP Bacteremia  #Moderate Fecal Retention   #H/o Diverticulosis   - In ED, T 98.8, /76, HR 77, RR 18, SpO2 97%  - Labs: AST 47, ALT 46  - VBG: lactate 2.2, pH 7.45, pCO2 38, pO2 58  - EKG: NSR  - CT A/P w IV Con: Moderate fecal retention. Nonobstructive bowel gas pattern. Colonic diverticulosis with no radiographic evidence diverticulitis. Mild increase in size with nonspecific lymph node anterior to the right psoas muscle. Mild increase in size in lymph nodes adjacent to the right saphenofemoral vessels. Mild haziness within the right inguinal canal of uncertain clinical significance. No significant hernia.  - s/p cefepime 2g, vanc 1g, 1L LR bolus.   - XR LS-spine, T-spine  - CT LS-spine ? (to take more detailed look at back?)  - ESR, CRP  - Trend lactate  - ID consult  - c/w Vanc/Cefepime  - gentle IVF  - bowel regimen     #Transaminitis  - AST 47, ALT 46  - RUQ US  - Monitor LFTs     #H/o RLE Lymphedema  #RLE Cellulitis  - RLE erythema, 2+ pitting edema on PE  - abx     #H/o MM  - c/w home meds  - OP f/u                                           --------------------------------------------------------------    # DVT prophylaxis: Lovenox  # GI prophylaxis: PPI  # Diet: DASH/TLC  # Activity: IAT with Assistance  # Code status: Full Code  # Disposition:    Pending:    74 year old female, past medical history MM, who presents to ed s/p +blood cultures. patient presented to ed yesterday with fever and back pain, dc pending blood cultures, +blood cultures with gram positive cocci in clusters prompting return to ed. patient has been taking motrin/tylenol. reports cough and back pain. denies chest pain, shortness of breath, vomiting, urinary symptoms, diarrhea.    #Fever, Back Pain (No fever here)  #GP Bacteremia  #Moderate Fecal Retention   #H/o Diverticulosis   - In ED, T 98.8, /76, HR 77, RR 18, SpO2 97%  - Labs: AST 47, ALT 46  - VBG: lactate 2.2, pH 7.45, pCO2 38, pO2 58  - EKG: NSR  - CT A/P w IV Con: Moderate fecal retention. Nonobstructive bowel gas pattern. Colonic diverticulosis with no radiographic evidence diverticulitis. Mild increase in size with nonspecific lymph node anterior to the right psoas muscle. Mild increase in size in lymph nodes adjacent to the right saphenofemoral vessels. Mild haziness within the right inguinal canal of uncertain clinical significance. No significant hernia.  - s/p cefepime 2g, vanc 1g, 1L LR bolus.   - MRSA  - XR LS-spine, T-spine  - CT LS-spine ? (to take more detailed look at back?)  - ESR, CRP  - Trend lactate  - ID consult  - c/w Vanc/Cefepime  - gentle IVF  - bowel regimen     #Transaminitis  - AST 47, ALT 46  - RUQ US  - Monitor LFTs     #H/o RLE Lymphedema  #RLE Cellulitis  - RLE erythema, 2+ pitting edema on PE  - abx     #H/o MM  - c/w home meds  - OP f/u                                           --------------------------------------------------------------    # DVT prophylaxis: Lovenox  # GI prophylaxis: PPI  # Diet: DASH/TLC  # Activity: IAT with Assistance  # Code status: Full Code  # Disposition:    Pending:    74 year old woman, past medical history MGUS (seen by H/O - see HIE) who presents to ED s/p +blood cultures. Patient presented to ed the day before with fever (101.6 at home) and low back pain w/ diff walking; Pt had WBC 13 and T 100.9 meeting criteria for SIRS, but was d/c'd pending blood cultures, +blood cultures with staph lugdunensis prompting return to ed. patient has been taking motrin/tylenol at home. reports cough and back pain. denies chest pain, shortness of breath, vomiting, urinary symptoms, diarrhea.    # pt is immunocompromised 2/2 age    # Fever (101.6 at home; 100.9 on 1st ED visit, now afeb) + low back pain (now improving); NO SEPSIS on this admit; poss bacteremia w/ Staph Lugdunensis; pt might have had rt leg cellulitis (but looks NOT infected now) (hs RLE lymphedema for few yrs)  In ED, T 98.8, /76, HR 77, RR 18, SpO2 97%  VBG: lactate 2.2, pH 7.45, pCO2 38, pO2 58  does NOT appear toxic  EKG: NSR  CT A/P w IV Con: Moderate fecal retention. Nonobstructive bowel gas pattern. Colonic diverticulosis with no radiographic evidence diverticulitis. Mild increase in size with nonspecific lymph node anterior to the right psoas muscle. Mild increase in size in lymph nodes adjacent to the right saphenofemoral vessels. Mild haziness within the right inguinal canal of uncertain clinical significance. No significant hernia.  ESR 12; CRP 78  BCx 1 of 2 w/ staph lugdunensis  rpt BCx x2 yesterday (already in lab x2) and fransisco AM   obtain MRSA nares  f/u read XR LS-spine, T-spine  obtain CT LS-spine   Echo  V Dupl  ID consult  abx: vanco 1gm iv q12 - dose w/ RPh  neurontin 100mg po q12 for leg burning    # dry eyes w/ minor irritation  no infection  blind rt eye w/ abnl cornea  art tears ou q6    # Moderate Fecal Retention; H/o Diverticulosis   bowel reg: PEG q12 + senna 2 hs    # minor Transaminitis - resolved  RUQ U/S: nl liver; GB absent; CBD 7mm  no further w/u  can check hep panel - inpt or outpt    # H/o M-spike; has plasma cell dyscrasia but NOT MM  slight rise K/L ratio  slight rise IgA (upper limit nl)  BM Bx: 5-8% plasma cells  Hgb, Ca, Cr, glob fxn, AG = all nl  outpt h/o as scheduled    # DVT prophylaxis: Lovenox    # GI prophylaxis: PPI    # Activity: amb w/ asst    # Code status: Full Code    # updated dtr at bedside    # Disposition: f/u ID; iv abx; rpt BCX; f/u xrays; CT L Sp; V Dupl; Echo; art tears; MRSA nares  eventually, pt will go home (plan per ID and above findings) - f/u CM - anticipate d/c "soon"   74 year old woman, past medical history MGUS (seen by H/O - see HIE) who presents to ED s/p +blood cultures. Patient presented to ed the day before with fever (101.6 at home) and low back pain w/ diff walking; Pt had WBC 13 and T 100.9 meeting criteria for SIRS, but was d/c'd pending blood cultures, +blood cultures with staph lugdunensis prompting return to ed. patient has been taking motrin/tylenol at home. reports cough and back pain. denies chest pain, shortness of breath, vomiting, urinary symptoms, diarrhea.    # pt is immunocompromised 2/2 age    # Fever (101.6 at home; 100.9 on 1st ED visit, now afeb) + low back pain (now improving); NO SEPSIS on this admit; poss bacteremia w/ Staph Lugdunensis; pt might have had rt leg cellulitis (but looks NOT infected now) (hs RLE lymphedema for few yrs)  In ED, T 98.8, /76, HR 77, RR 18, SpO2 97%  VBG: lactate 2.2, pH 7.45, pCO2 38, pO2 58  does NOT appear toxic  EKG: NSR  CT A/P w IV Con: Moderate fecal retention. Nonobstructive bowel gas pattern. Colonic diverticulosis with no radiographic evidence diverticulitis. Mild increase in size with nonspecific lymph node anterior to the right psoas muscle. Mild increase in size in lymph nodes adjacent to the right saphenofemoral vessels. Mild haziness within the right inguinal canal of uncertain clinical significance. No significant hernia.  ESR 12; CRP 78  BCx 1 of 2 w/ staph lugdunensis  rpt BCx x2 yesterday (already in lab x2) and fransisco AM   obtain MRSA nares  f/u read XR LS-spine, T-spine  d/c CT LS-spine: since CT A/P does not suggest any back pathology  obtain MRI L/S spine w/ gado  Echo  V Dupl  ID consult  abx: vanco 1gm iv q12 - dose w/ RPh  neurontin 100mg po q12 for leg burning    # dry eyes w/ minor irritation  no infection  blind rt eye w/ abnl cornea  art tears ou q6    # Moderate Fecal Retention; H/o Diverticulosis   bowel reg: PEG q12 + senna 2 hs    # minor Transaminitis - resolved  RUQ U/S: nl liver; GB absent; CBD 7mm  no further w/u  can check hep panel - inpt or outpt    # H/o M-spike; has plasma cell dyscrasia but NOT MM  slight rise K/L ratio  slight rise IgA (upper limit nl)  BM Bx: 5-8% plasma cells  Hgb, Ca, Cr, glob fxn, AG = all nl  outpt h/o as scheduled    # DVT prophylaxis: Lovenox    # GI prophylaxis: PPI    # Activity: amb w/ asst    # Code status: Full Code    # updated dtr at bedside    # Disposition: f/u ID; iv abx; rpt BCX; f/u xrays; d/c CT L Sp; MRI L Sp w/ gado; V Dupl; Echo; art tears; MRSA nares  eventually, pt will go home (plan per ID and above findings) - f/u CM - anticipate d/c "soon"

## 2025-03-14 NOTE — ED PROVIDER NOTE - OBJECTIVE STATEMENT
74 year old female, past medical history MM, who presents to ed s/p +blood cultures. patient presented to ed yesterday with fever and back pain, dc pending blood cultures, +blood cultures with gram positive cocci in clusters prompting return to ed. patient has been taking motrin/tylenol. reports cough and back pain. denies chest pain, shortness of breath, vomiting, urinary symptoms, diarrhea.

## 2025-03-14 NOTE — ED ADULT NURSE NOTE - OBJECTIVE STATEMENT
Aox4 pt with daughter at bedside who state they were called back for abnormal blood cultures. IV palced, labs sent and meds given per provider order. Placed on cardiac monitor

## 2025-03-15 PROBLEM — C90.00 MULTIPLE MYELOMA NOT HAVING ACHIEVED REMISSION: Chronic | Status: ACTIVE | Noted: 2025-03-13

## 2025-03-15 LAB
ALBUMIN SERPL ELPH-MCNC: 3.7 G/DL — SIGNIFICANT CHANGE UP (ref 3.5–5.2)
ALP SERPL-CCNC: 80 U/L — SIGNIFICANT CHANGE UP (ref 30–115)
ALT FLD-CCNC: 36 U/L — SIGNIFICANT CHANGE UP (ref 0–41)
ANION GAP SERPL CALC-SCNC: 11 MMOL/L — SIGNIFICANT CHANGE UP (ref 7–14)
AST SERPL-CCNC: 31 U/L — SIGNIFICANT CHANGE UP (ref 0–41)
BASOPHILS # BLD AUTO: 0.02 K/UL — SIGNIFICANT CHANGE UP (ref 0–0.2)
BASOPHILS NFR BLD AUTO: 0.4 % — SIGNIFICANT CHANGE UP (ref 0–1)
BILIRUB SERPL-MCNC: 0.3 MG/DL — SIGNIFICANT CHANGE UP (ref 0.2–1.2)
BUN SERPL-MCNC: 12 MG/DL — SIGNIFICANT CHANGE UP (ref 10–20)
CALCIUM SERPL-MCNC: 8.2 MG/DL — LOW (ref 8.4–10.5)
CHLORIDE SERPL-SCNC: 106 MMOL/L — SIGNIFICANT CHANGE UP (ref 98–110)
CO2 SERPL-SCNC: 25 MMOL/L — SIGNIFICANT CHANGE UP (ref 17–32)
CREAT SERPL-MCNC: 0.6 MG/DL — LOW (ref 0.7–1.5)
CRP SERPL-MCNC: 77.9 MG/L — HIGH
EGFR: 94 ML/MIN/1.73M2 — SIGNIFICANT CHANGE UP
EGFR: 94 ML/MIN/1.73M2 — SIGNIFICANT CHANGE UP
EOSINOPHIL # BLD AUTO: 0.09 K/UL — SIGNIFICANT CHANGE UP (ref 0–0.7)
EOSINOPHIL NFR BLD AUTO: 1.6 % — SIGNIFICANT CHANGE UP (ref 0–8)
ERYTHROCYTE [SEDIMENTATION RATE] IN BLOOD: 12 MM/HR — SIGNIFICANT CHANGE UP (ref 0–20)
GLUCOSE SERPL-MCNC: 97 MG/DL — SIGNIFICANT CHANGE UP (ref 70–99)
HCT VFR BLD CALC: 38.4 % — SIGNIFICANT CHANGE UP (ref 37–47)
HGB BLD-MCNC: 12.5 G/DL — SIGNIFICANT CHANGE UP (ref 12–16)
IMM GRANULOCYTES NFR BLD AUTO: 0.4 % — HIGH (ref 0.1–0.3)
LACTATE SERPL-SCNC: 0.7 MMOL/L — SIGNIFICANT CHANGE UP (ref 0.7–2)
LYMPHOCYTES # BLD AUTO: 1.47 K/UL — SIGNIFICANT CHANGE UP (ref 1.2–3.4)
LYMPHOCYTES # BLD AUTO: 26.5 % — SIGNIFICANT CHANGE UP (ref 20.5–51.1)
MAGNESIUM SERPL-MCNC: 1.9 MG/DL — SIGNIFICANT CHANGE UP (ref 1.8–2.4)
MCHC RBC-ENTMCNC: 29.8 PG — SIGNIFICANT CHANGE UP (ref 27–31)
MCHC RBC-ENTMCNC: 32.6 G/DL — SIGNIFICANT CHANGE UP (ref 32–37)
MCV RBC AUTO: 91.6 FL — SIGNIFICANT CHANGE UP (ref 81–99)
MONOCYTES NFR BLD AUTO: 7.9 % — SIGNIFICANT CHANGE UP (ref 1.7–9.3)
MRSA PCR RESULT.: NEGATIVE — SIGNIFICANT CHANGE UP
NEUTROPHILS # BLD AUTO: 3.51 K/UL — SIGNIFICANT CHANGE UP (ref 1.4–6.5)
NEUTROPHILS NFR BLD AUTO: 63.2 % — SIGNIFICANT CHANGE UP (ref 42.2–75.2)
NRBC BLD AUTO-RTO: 0 /100 WBCS — SIGNIFICANT CHANGE UP (ref 0–0)
PLATELET # BLD AUTO: 132 K/UL — SIGNIFICANT CHANGE UP (ref 130–400)
PMV BLD: 11.4 FL — HIGH (ref 7.4–10.4)
POTASSIUM SERPL-MCNC: 3.7 MMOL/L — SIGNIFICANT CHANGE UP (ref 3.5–5)
POTASSIUM SERPL-SCNC: 3.7 MMOL/L — SIGNIFICANT CHANGE UP (ref 3.5–5)
PROT SERPL-MCNC: 6.1 G/DL — SIGNIFICANT CHANGE UP (ref 6–8)
RBC # BLD: 4.19 M/UL — LOW (ref 4.2–5.4)
RBC # FLD: 14.6 % — HIGH (ref 11.5–14.5)
SODIUM SERPL-SCNC: 142 MMOL/L — SIGNIFICANT CHANGE UP (ref 135–146)
WBC # BLD: 5.55 K/UL — SIGNIFICANT CHANGE UP (ref 4.8–10.8)
WBC # FLD AUTO: 5.55 K/UL — SIGNIFICANT CHANGE UP (ref 4.8–10.8)

## 2025-03-15 PROCEDURE — 72074 X-RAY EXAM THORAC SPINE4/>VW: CPT | Mod: 26

## 2025-03-15 PROCEDURE — 99223 1ST HOSP IP/OBS HIGH 75: CPT

## 2025-03-15 PROCEDURE — 72110 X-RAY EXAM L-2 SPINE 4/>VWS: CPT | Mod: 26

## 2025-03-15 PROCEDURE — 76705 ECHO EXAM OF ABDOMEN: CPT | Mod: 26

## 2025-03-15 PROCEDURE — 72158 MRI LUMBAR SPINE W/O & W/DYE: CPT | Mod: 26

## 2025-03-15 RX ORDER — GABAPENTIN 400 MG/1
100 CAPSULE ORAL EVERY 12 HOURS
Refills: 0 | Status: DISCONTINUED | OUTPATIENT
Start: 2025-03-15 | End: 2025-03-17

## 2025-03-15 RX ORDER — LISINOPRIL 5 MG/1
40 TABLET ORAL DAILY
Refills: 0 | Status: DISCONTINUED | OUTPATIENT
Start: 2025-03-15 | End: 2025-03-17

## 2025-03-15 RX ORDER — LEVOTHYROXINE SODIUM 300 MCG
112 TABLET ORAL DAILY
Refills: 0 | Status: DISCONTINUED | OUTPATIENT
Start: 2025-03-15 | End: 2025-03-17

## 2025-03-15 RX ORDER — VANCOMYCIN HCL IN 5 % DEXTROSE 1.5G/250ML
1000 PLASTIC BAG, INJECTION (ML) INTRAVENOUS ONCE
Refills: 0 | Status: COMPLETED | OUTPATIENT
Start: 2025-03-15 | End: 2025-03-15

## 2025-03-15 RX ORDER — ATORVASTATIN CALCIUM 80 MG/1
10 TABLET, FILM COATED ORAL AT BEDTIME
Refills: 0 | Status: DISCONTINUED | OUTPATIENT
Start: 2025-03-15 | End: 2025-03-17

## 2025-03-15 RX ORDER — ALBUTEROL SULFATE 2.5 MG/3ML
2 VIAL, NEBULIZER (ML) INHALATION
Refills: 0 | DISCHARGE

## 2025-03-15 RX ORDER — INFLUENZA A VIRUS A/IDAHO/07/2018 (H1N1) ANTIGEN (MDCK CELL DERIVED, PROPIOLACTONE INACTIVATED, INFLUENZA A VIRUS A/INDIANA/08/2018 (H3N2) ANTIGEN (MDCK CELL DERIVED, PROPIOLACTONE INACTIVATED), INFLUENZA B VIRUS B/SINGAPORE/INFTT-16-0610/2016 ANTIGEN (MDCK CELL DERIVED, PROPIOLACTONE INACTIVATED), INFLUENZA B VIRUS B/IOWA/06/2017 ANTIGEN (MDCK CELL DERIVED, PROPIOLACTONE INACTIVATED) 15; 15; 15; 15 UG/.5ML; UG/.5ML; UG/.5ML; UG/.5ML
0.5 INJECTION, SUSPENSION INTRAMUSCULAR ONCE
Refills: 0 | Status: DISCONTINUED | OUTPATIENT
Start: 2025-03-15 | End: 2025-03-17

## 2025-03-15 RX ORDER — VANCOMYCIN HCL IN 5 % DEXTROSE 1.5G/250ML
1000 PLASTIC BAG, INJECTION (ML) INTRAVENOUS EVERY 12 HOURS
Refills: 0 | Status: DISCONTINUED | OUTPATIENT
Start: 2025-03-15 | End: 2025-03-15

## 2025-03-15 RX ORDER — VANCOMYCIN HCL IN 5 % DEXTROSE 1.5G/250ML
1000 PLASTIC BAG, INJECTION (ML) INTRAVENOUS EVERY 12 HOURS
Refills: 0 | Status: DISCONTINUED | OUTPATIENT
Start: 2025-03-15 | End: 2025-03-16

## 2025-03-15 RX ORDER — HYDROCORTISONE 10 MG/G
1 CREAM TOPICAL AT BEDTIME
Refills: 0 | Status: DISCONTINUED | OUTPATIENT
Start: 2025-03-15 | End: 2025-03-17

## 2025-03-15 RX ORDER — LISINOPRIL 5 MG/1
1 TABLET ORAL
Refills: 0 | DISCHARGE

## 2025-03-15 RX ORDER — ACETAMINOPHEN 500 MG/5ML
650 LIQUID (ML) ORAL ONCE
Refills: 0 | Status: COMPLETED | OUTPATIENT
Start: 2025-03-15 | End: 2025-03-15

## 2025-03-15 RX ORDER — HYPROMELLOSE 0.4 %
1 DROPS OPHTHALMIC (EYE) EVERY 6 HOURS
Refills: 0 | Status: DISCONTINUED | OUTPATIENT
Start: 2025-03-15 | End: 2025-03-17

## 2025-03-15 RX ORDER — VANCOMYCIN HCL IN 5 % DEXTROSE 1.5G/250ML
PLASTIC BAG, INJECTION (ML) INTRAVENOUS
Refills: 0 | Status: DISCONTINUED | OUTPATIENT
Start: 2025-03-15 | End: 2025-03-16

## 2025-03-15 RX ORDER — ATORVASTATIN CALCIUM 80 MG/1
1 TABLET, FILM COATED ORAL
Refills: 0 | DISCHARGE

## 2025-03-15 RX ADMIN — HYDROCORTISONE 1 APPLICATION(S): 10 CREAM TOPICAL at 21:45

## 2025-03-15 RX ADMIN — POLYETHYLENE GLYCOL 3350 17 GRAM(S): 17 POWDER, FOR SOLUTION ORAL at 17:05

## 2025-03-15 RX ADMIN — Medication 650 MILLIGRAM(S): at 22:16

## 2025-03-15 RX ADMIN — Medication 1 DROP(S): at 23:37

## 2025-03-15 RX ADMIN — ENOXAPARIN SODIUM 40 MILLIGRAM(S): 100 INJECTION SUBCUTANEOUS at 21:44

## 2025-03-15 RX ADMIN — Medication 1 DROP(S): at 17:06

## 2025-03-15 RX ADMIN — POLYETHYLENE GLYCOL 3350 17 GRAM(S): 17 POWDER, FOR SOLUTION ORAL at 05:53

## 2025-03-15 RX ADMIN — Medication 250 MILLIGRAM(S): at 17:05

## 2025-03-15 RX ADMIN — Medication 250 MILLIGRAM(S): at 11:09

## 2025-03-15 RX ADMIN — Medication 650 MILLIGRAM(S): at 21:44

## 2025-03-15 RX ADMIN — GABAPENTIN 100 MILLIGRAM(S): 400 CAPSULE ORAL at 13:32

## 2025-03-15 RX ADMIN — Medication 1 DROP(S): at 15:51

## 2025-03-15 RX ADMIN — LISINOPRIL 40 MILLIGRAM(S): 5 TABLET ORAL at 05:53

## 2025-03-15 RX ADMIN — Medication 2 TABLET(S): at 21:44

## 2025-03-15 RX ADMIN — Medication 500 MILLIGRAM(S): at 12:24

## 2025-03-15 RX ADMIN — Medication 112 MICROGRAM(S): at 05:53

## 2025-03-15 RX ADMIN — GABAPENTIN 100 MILLIGRAM(S): 400 CAPSULE ORAL at 17:05

## 2025-03-15 RX ADMIN — ATORVASTATIN CALCIUM 10 MILLIGRAM(S): 80 TABLET, FILM COATED ORAL at 21:44

## 2025-03-15 NOTE — PATIENT PROFILE ADULT - STATED REASON FOR ADMISSION
This is a chronic problem that appears compensated/controlled and stable. He feels that the severity of symptoms do not warrant further evaluation or treatment.   Fever and headache

## 2025-03-15 NOTE — PROGRESS NOTE ADULT - ASSESSMENT
74 year old female, past medical history MGUS, who presents to ed s/p +blood cultures.      #Fever, Back Pain (No fever during this admission, 101.6 at home & 100.9 on 1st ED visit)  #+ve Cultures (Bacteremia Vs Contamination)  - BCx #1) staph lugdunensis, #2) negative  - In ED, T 98.8, /76, HR 77, RR 18, SpO2 97%  - Labs: AST 47, ALT 46  - VBG: lactate 2.2, pH 7.45, pCO2 38, pO2 58  - EKG: NSR  - Has RLE Lymphedema for 2 years, Had redness 2 days ago. could be RLE Cellulitis  - CT A/P w IV Con: Moderate fecal retention. Nonobstructive bowel gas pattern. Colonic diverticulosis with no radiographic evidence diverticulitis. Mild increase in size with nonspecific lymph node anterior to the right psoas muscle. Mild increase in size in lymph nodes adjacent to the right saphenofemoral vessels. Mild haziness within the right inguinal canal of uncertain clinical significance. No significant hernia.  - s/p cefepime 2g, vanc 1g, 1L LR bolus.   - XR LS-spine, T-spine  - MRI Spine w/wo to r/o infection  - ESR 12; CRP 78  - Trend lactate 2.4->2.2->0.7  - ID consult   - c/w Vanc      #Transaminitis  - AST 47, ALT 46  - RUQ US  - Monitor LFTs     #H/o RLE Lymphedema  #RLE Cellulitis  - RLE erythema, 2+ pitting edema on PE  - abx       #H/o MGUS  - Not on treatment  - Hem/onc OP f/u                                          # DVT prophylaxis: Lovenox  # GI prophylaxis: PPI  # Diet: DASH/TLC  # Activity: IAT with Assistance  # Code status: Full Code  # Disposition: 74 year old female, past medical history MGUS, who presents to ed s/p +blood cultures.      #Fever, Back Pain (No fever during this admission, 101.6 at home & 100.9 on 1st ED visit)  #+ve Cultures (Bacteremia Vs Contamination)  - BCx #1) staph lugdunensis, #2) negative  - In ED, T 98.8, /76, HR 77, RR 18, SpO2 97%  - Labs: AST 47, ALT 46  - VBG: lactate 2.2, pH 7.45, pCO2 38, pO2 58  - EKG: NSR  - Has RLE Lymphedema for 2 years, Had redness 2 days ago. could be RLE Cellulitis  - CT A/P w IV Con: Moderate fecal retention. Nonobstructive bowel gas pattern. Colonic diverticulosis with no radiographic evidence diverticulitis. Mild increase in size with nonspecific lymph node anterior to the right psoas muscle. Mild increase in size in lymph nodes adjacent to the right saphenofemoral vessels. Mild haziness within the right inguinal canal of uncertain clinical significance. No significant hernia.  - s/p cefepime 2g, vanc 1g, 1L LR bolus.   - XR LS-spine, T-spine  - MRI Spine w/wo to r/o infection  - ESR 12; CRP 78  - Trend lactate 2.4->2.2->0.7  - ID consult   - c/w Vanc      #Transaminitis  - AST 47, ALT 46  - RUQ US  - Monitor LFTs         #H/o MGUS  - Not on treatment  - Hem/onc OP f/u                                          # DVT prophylaxis: Lovenox  # GI prophylaxis: PPI  # Diet: DASH/TLC  # Activity: IAT with Assistance  # Code status: Full Code  # Disposition:

## 2025-03-15 NOTE — CONSULT NOTE ADULT - SUBJECTIVE AND OBJECTIVE BOX
BRIANNA JIMENEZ  74y, Female  Allergy: No Known Allergies      CHIEF COMPLAINT: Fever, Back Pain (14 Mar 2025 22:44)      LOS  1d    HPI:  74 year old female, past medical history MM, who presents to ed s/p +blood cultures. patient presented to ed yesterday with fever and back pain, dc pending blood cultures, +blood cultures with gram positive cocci in clusters prompting return to ed. patient has been taking motrin/tylenol. reports back pain localized to mid- lumbosacral spine. denies chest pain, shortness of breath, vomiting, urinary symptoms, diarrhea.    In ED, T 98.8, /76, HR 77, RR 18, SpO2 97%  Labs: AST 47, ALT 46  VBG: lactate 2.2, pH 7.45, pCO2 38, pO2 58    EKG: NSR  CT A/P w IV Con: Moderate fecal retention. Nonobstructive bowel gas pattern. Colonic   diverticulosis with no radiographic evidence diverticulitis. Mild increase in size with nonspecific lymph node anterior to the right   psoas muscle. Mild increase in size in lymph nodes adjacent to the right   saphenofemoral vessels. Mild haziness within the right inguinal canalof   uncertain clinical significance. No significant hernia.    s/p cefepime 2g, vanc 1g, 1L LR bolus.   Admitted to medicine for further management.          (14 Mar 2025 22:44)      INFECTIOUS DISEASE HISTORY:  History as above.    PAST MEDICAL & SURGICAL HISTORY:  Multiple myeloma          FAMILY HISTORY      SOCIAL HISTORY  Social History:        ROS  General: Denies rigors, nightsweats  HEENT: Denies headache, rhinorrhea, sore throat, eye pain  CV: Denies CP, palpitations  PULM: Denies wheezing, hemoptysis  GI: Denies hematemesis, hematochezia, melena  : Denies discharge, hematuria  MSK: Denies arthralgias, myalgias  SKIN: Denies rash, lesions  NEURO: Denies paresthesias, weakness  PSYCH: Denies depression, anxiety    VITALS:  T(F): 98.7, Max: 98.8 (03-14-25 @ 18:29)  HR: 87  BP: 145/77  RR: 18Vital Signs Last 24 Hrs  T(C): 37.1 (15 Mar 2025 04:32), Max: 37.1 (14 Mar 2025 18:29)  T(F): 98.7 (15 Mar 2025 04:32), Max: 98.8 (14 Mar 2025 18:29)  HR: 87 (15 Mar 2025 04:32) (72 - 87)  BP: 145/77 (15 Mar 2025 04:32) (143/76 - 154/69)  BP(mean): 100 (15 Mar 2025 02:56) (100 - 100)  RR: 18 (15 Mar 2025 04:32) (18 - 18)  SpO2: 98% (15 Mar 2025 04:32) (96% - 98%)    Parameters below as of 15 Mar 2025 02:56  Patient On (Oxygen Delivery Method): room air        PHYSICAL EXAM:  Gen: NAD, resting in bed  HEENT: Normocephalic, atraumatic  Neck: supple, no lymphadenopathy  CV: Regular rate & regular rhythm  Lungs: decreased BS at bases, no fremitus  Abdomen: Soft, BS present  Ext: Warm, well perfused  Neuro: non focal, awake  Skin: no rash, no erythema  Lines: no phlebitis    TESTS & MEASUREMENTS:                        12.5   5.55  )-----------( 132      ( 15 Mar 2025 05:32 )             38.4     03-15    142  |  106  |  12  ----------------------------<  97  3.7   |  25  |  0.6[L]    Ca    8.2[L]      15 Mar 2025 05:32  Mg     1.9     03-15    TPro  6.1  /  Alb  3.7  /  TBili  0.3  /  DBili  x   /  AST  31  /  ALT  36  /  AlkPhos  80  03-15      LIVER FUNCTIONS - ( 15 Mar 2025 05:32 )  Alb: 3.7 g/dL / Pro: 6.1 g/dL / ALK PHOS: 80 U/L / ALT: 36 U/L / AST: 31 U/L / GGT: x           Urinalysis Basic - ( 15 Mar 2025 05:32 )    Color: x / Appearance: x / SG: x / pH: x  Gluc: 97 mg/dL / Ketone: x  / Bili: x / Urobili: x   Blood: x / Protein: x / Nitrite: x   Leuk Esterase: x / RBC: x / WBC x   Sq Epi: x / Non Sq Epi: x / Bacteria: x        Urinalysis with Rflx Culture (collected 03-13-25 @ 12:00)    Culture - Blood (collected 03-13-25 @ 10:13)  Source: Blood Blood-Peripheral  Preliminary Report (03-14-25 @ 22:01):    No growth at 24 hours    Culture - Blood (collected 03-13-25 @ 10:13)  Source: Blood Blood-Peripheral  Gram Stain (03-14-25 @ 15:33):    Growth in aerobic bottle: Gram Positive Cocci in Clusters  Preliminary Report (03-15-25 @ 12:09):    Growth in aerobic bottle: Staphylococcus lugdunensis    Direct identification is available within approximately 3-5    hours either by Blood Panel Multiplexed PCR or Direct    MALDI-TOF. Details: [email or web address]  Organism: Blood Culture PCR (03-14-25 @ 18:06)  Organism: Blood Culture PCR (03-14-25 @ 18:06)      Method Type: PCR      -  Staphylococcus lugdunensis: Detec        Lactate, Blood: 0.7 mmol/L (03-15-25 @ 05:32)  Blood Gas Venous - Lactate: 2.2 mmol/L (03-13-25 @ 10:33)  Lactate, Blood: 2.4 mmol/L (03-13-25 @ 10:05)      INFECTIOUS DISEASES TESTING      RADIOLOGY & ADDITIONAL TESTS:  I have personally reviewed the last Chest xray  CXR      CT  CT Abdomen and Pelvis w/ IV Cont:   ACC: [id number] EXAM:  CT ABDOMEN AND PELVIS IC   ORDERED BY: TABITHA HAGEN     PROCEDURE DATE:  03/13/2025          INTERPRETATION:  CLINICAL INFORMATION: Abdominal and back pain    COMPARISON: CT abdomen pelvis January 15, 2023    CONTRAST/COMPLICATIONS:  IV Contrast: Omnipaque 350  95 cc administered   5 cc discarded  Oral Contrast: NONE  .    PROCEDURE:  CT of the Abdomen and Pelvis was performed.  Sagittal and coronal reformats were performed.    FINDINGS:  LOWER CHEST: Bibasilar atelectasis.    LIVER: Within normal limits.  BILE DUCTS: Normal caliber.  GALLBLADDER: Status post cholecystectomy.  SPLEEN: Within normal limits.  PANCREAS: Within normal limits.  ADRENALS: Within normal limits.  KIDNEYS/URETERS: Symmetric enhancement bilaterally. Bilateral parapelvic   cysts are again noted.    BLADDER: Within normal limits.  REPRODUCTIVE ORGANS: Calcified fibroid uterus.    BOWEL: Moderate fecal retention. Colonic diverticulosis with no   radiographic evidence diverticulitis. No bowel obstruction. The appendix   unremarkable.  PERITONEUM/RETROPERITONEUM: Mild haziness is seen adjacent to the right   superior femoral arteries of uncertain clinical significance. No evidence   of significant hernia within the right inguinal canal.  VESSELS: Atherosclerotic aorta. No aneurysmal dilatation.  LYMPH NODES: Mild increase in lymph node anterior to the right psoas   muscle measuring up to 1.6 cm. Mild increase in subcentimeter lymph nodes   adjacent to the right superficial femoral vessels.  ABDOMINAL WALL: Within normal limits.  BONES: Multilevel degenerative changes. The bones appear osteopenic.   Scoliotic curvature.    IMPRESSION:  Moderate fecal retention. Nonobstructive bowel gas pattern. Colonic   diverticulosis with no radiographic evidence diverticulitis.    Mild increase in size with nonspecific lymph node anterior to the right   psoas muscle. Mild increase in size in lymph nodes adjacent to the right   saphenofemoral vessels. Mild haziness within the right inguinal canalof   uncertain clinical significance. No significant hernia.        --- End of Report ---            DAMION RUIZ MD; Attending Radiologist  This document has been electronically signed. Mar 13 2025 12:12PM (03-13-25 @ 11:49)      CARDIOLOGY TESTING  12 Lead ECG:   Ventricular Rate 91 BPM    QRS Duration 76 ms    Q-T Interval 384 ms    QTC Calculation(Bazett) 472 ms    R Axis 84 degrees    T Axis 32 degrees    Diagnosis Line *** Poor data quality, interpretation may be adversely affected  Normal sinus rhythm  Cannot rule out Inferior infarct , age undetermined  Abnormal ECG    Confirmed by Krunal Dorsey ([id number]) on 3/13/2025 12:29:40 PM (03-13-25 @ 09:40)      MEDICATIONS  artificial  tears Solution 1 Both EYES every 6 hours  atorvastatin 10 Oral at bedtime  enoxaparin Injectable 40 SubCutaneous every 24 hours  gabapentin 100 Oral every 12 hours  hydrocortisone 1% Cream 1 Topical at bedtime  influenza  Vaccine (HIGH DOSE) 0.5 IntraMuscular once  levothyroxine 112 Oral daily  lisinopril 40 Oral daily  polyethylene glycol 3350 17 Oral two times a day  senna 2 Oral at bedtime  valACYclovir 500 Oral daily  vancomycin  IVPB     vancomycin  IVPB 1000 IV Intermittent every 12 hours      Weight  Weight (kg): 74.8 (03-14-25 @ 18:29)    ANTIBIOTICS:  valACYclovir 500 milliGRAM(s) Oral daily  vancomycin  IVPB      vancomycin  IVPB 1000 milliGRAM(s) IV Intermittent every 12 hours      ALLERGIES:  No Known Allergies     BRIANNA JIMENEZ  74y, Female  Allergy: No Known Allergies      CHIEF COMPLAINT: Fever, Back Pain (14 Mar 2025 22:44)      LOS  1d    HPI:  74 year old female, past medical history MM, who presents to ed s/p +blood cultures. patient presented to ed yesterday with fever and back pain, dc pending blood cultures, +blood cultures with gram positive cocci in clusters prompting return to ed. patient has been taking motrin/tylenol. reports back pain localized to mid- lumbosacral spine. denies chest pain, shortness of breath, vomiting, urinary symptoms, diarrhea.    In ED, T 98.8, /76, HR 77, RR 18, SpO2 97%  Labs: AST 47, ALT 46  VBG: lactate 2.2, pH 7.45, pCO2 38, pO2 58    EKG: NSR  CT A/P w IV Con: Moderate fecal retention. Nonobstructive bowel gas pattern. Colonic   diverticulosis with no radiographic evidence diverticulitis. Mild increase in size with nonspecific lymph node anterior to the right   psoas muscle. Mild increase in size in lymph nodes adjacent to the right   saphenofemoral vessels. Mild haziness within the right inguinal canalof   uncertain clinical significance. No significant hernia.    s/p cefepime 2g, vanc 1g, 1L LR bolus.   Admitted to medicine for further management.          (14 Mar 2025 22:44)      INFECTIOUS DISEASE HISTORY:  History as above.  Reports acute onset back pain the morning prior to admission, to the point that it was difficult for her to move lower extremities.   Associated with fevers.   Denies any recent hospitalizations.   No recent back injections.   Has chronic RLE lymphedema, but denies any recent redness/swelling more than usual to suggest cellulitis.   Denies chest pain, shortness of breath, abdominal pain.   No ohter joint pains.   Today, back pain is improved and she is able to walk to bathroom    PAST MEDICAL & SURGICAL HISTORY:  Multiple myeloma          FAMILY HISTORY  Family history reviewed and non-contributory      SOCIAL HISTORY  Social History:  Denies etoh use, drug use       ROS  General: Denies rigors, nightsweats  HEENT: Denies headache, rhinorrhea, sore throat, eye pain  CV: Denies CP, palpitations  PULM: Denies wheezing, hemoptysis  GI: Denies hematemesis, hematochezia, melena  : Denies discharge, hematuria  MSK: Denies arthralgias, myalgias  SKIN: Denies rash, lesions  NEURO: Denies paresthesias, weakness  PSYCH: Denies depression, anxiety    VITALS:  T(F): 98.7, Max: 98.8 (03-14-25 @ 18:29)  HR: 87  BP: 145/77  RR: 18Vital Signs Last 24 Hrs  T(C): 37.1 (15 Mar 2025 04:32), Max: 37.1 (14 Mar 2025 18:29)  T(F): 98.7 (15 Mar 2025 04:32), Max: 98.8 (14 Mar 2025 18:29)  HR: 87 (15 Mar 2025 04:32) (72 - 87)  BP: 145/77 (15 Mar 2025 04:32) (143/76 - 154/69)  BP(mean): 100 (15 Mar 2025 02:56) (100 - 100)  RR: 18 (15 Mar 2025 04:32) (18 - 18)  SpO2: 98% (15 Mar 2025 04:32) (96% - 98%)    Parameters below as of 15 Mar 2025 02:56  Patient On (Oxygen Delivery Method): room air        PHYSICAL EXAM:  Gen: NAD, resting in bed  HEENT: Normocephalic, atraumatic  Neck: supple, no lymphadenopathy  CV: Regular rate & regular rhythm  Lungs: decreased BS at bases, no fremitus  Abdomen: Soft, BS present  Ext: Warm, well perfused  Neuro: non focal, awake  Skin: no rash, no erythema  Lines: no phlebitis    TESTS & MEASUREMENTS:                        12.5   5.55  )-----------( 132      ( 15 Mar 2025 05:32 )             38.4     03-15    142  |  106  |  12  ----------------------------<  97  3.7   |  25  |  0.6[L]    Ca    8.2[L]      15 Mar 2025 05:32  Mg     1.9     03-15    TPro  6.1  /  Alb  3.7  /  TBili  0.3  /  DBili  x   /  AST  31  /  ALT  36  /  AlkPhos  80  03-15      LIVER FUNCTIONS - ( 15 Mar 2025 05:32 )  Alb: 3.7 g/dL / Pro: 6.1 g/dL / ALK PHOS: 80 U/L / ALT: 36 U/L / AST: 31 U/L / GGT: x           Urinalysis Basic - ( 15 Mar 2025 05:32 )    Color: x / Appearance: x / SG: x / pH: x  Gluc: 97 mg/dL / Ketone: x  / Bili: x / Urobili: x   Blood: x / Protein: x / Nitrite: x   Leuk Esterase: x / RBC: x / WBC x   Sq Epi: x / Non Sq Epi: x / Bacteria: x        Urinalysis with Rflx Culture (collected 03-13-25 @ 12:00)    Culture - Blood (collected 03-13-25 @ 10:13)  Source: Blood Blood-Peripheral  Preliminary Report (03-14-25 @ 22:01):    No growth at 24 hours    Culture - Blood (collected 03-13-25 @ 10:13)  Source: Blood Blood-Peripheral  Gram Stain (03-14-25 @ 15:33):    Growth in aerobic bottle: Gram Positive Cocci in Clusters  Preliminary Report (03-15-25 @ 12:09):    Growth in aerobic bottle: Staphylococcus lugdunensis    Direct identification is available within approximately 3-5    hours either by Blood Panel Multiplexed PCR or Direct    MALDI-TOF. Details: [email or web address]  Organism: Blood Culture PCR (03-14-25 @ 18:06)  Organism: Blood Culture PCR (03-14-25 @ 18:06)      Method Type: PCR      -  Staphylococcus lugdunensis: Detec        Lactate, Blood: 0.7 mmol/L (03-15-25 @ 05:32)  Blood Gas Venous - Lactate: 2.2 mmol/L (03-13-25 @ 10:33)  Lactate, Blood: 2.4 mmol/L (03-13-25 @ 10:05)      INFECTIOUS DISEASES TESTING      RADIOLOGY & ADDITIONAL TESTS:  I have personally reviewed the last Chest xray  CXR      CT  CT Abdomen and Pelvis w/ IV Cont:   ACC: [id number] EXAM:  CT ABDOMEN AND PELVIS IC   ORDERED BY: TABITHA HAGEN     PROCEDURE DATE:  03/13/2025          INTERPRETATION:  CLINICAL INFORMATION: Abdominal and back pain    COMPARISON: CT abdomen pelvis January 15, 2023    CONTRAST/COMPLICATIONS:  IV Contrast: Omnipaque 350  95 cc administered   5 cc discarded  Oral Contrast: NONE  .    PROCEDURE:  CT of the Abdomen and Pelvis was performed.  Sagittal and coronal reformats were performed.    FINDINGS:  LOWER CHEST: Bibasilar atelectasis.    LIVER: Within normal limits.  BILE DUCTS: Normal caliber.  GALLBLADDER: Status post cholecystectomy.  SPLEEN: Within normal limits.  PANCREAS: Within normal limits.  ADRENALS: Within normal limits.  KIDNEYS/URETERS: Symmetric enhancement bilaterally. Bilateral parapelvic   cysts are again noted.    BLADDER: Within normal limits.  REPRODUCTIVE ORGANS: Calcified fibroid uterus.    BOWEL: Moderate fecal retention. Colonic diverticulosis with no   radiographic evidence diverticulitis. No bowel obstruction. The appendix   unremarkable.  PERITONEUM/RETROPERITONEUM: Mild haziness is seen adjacent to the right   superior femoral arteries of uncertain clinical significance. No evidence   of significant hernia within the right inguinal canal.  VESSELS: Atherosclerotic aorta. No aneurysmal dilatation.  LYMPH NODES: Mild increase in lymph node anterior to the right psoas   muscle measuring up to 1.6 cm. Mild increase in subcentimeter lymph nodes   adjacent to the right superficial femoral vessels.  ABDOMINAL WALL: Within normal limits.  BONES: Multilevel degenerative changes. The bones appear osteopenic.   Scoliotic curvature.    IMPRESSION:  Moderate fecal retention. Nonobstructive bowel gas pattern. Colonic   diverticulosis with no radiographic evidence diverticulitis.    Mild increase in size with nonspecific lymph node anterior to the right   psoas muscle. Mild increase in size in lymph nodes adjacent to the right   saphenofemoral vessels. Mild haziness within the right inguinal canalof   uncertain clinical significance. No significant hernia.        --- End of Report ---            DAMION RUIZ MD; Attending Radiologist  This document has been electronically signed. Mar 13 2025 12:12PM (03-13-25 @ 11:49)      CARDIOLOGY TESTING  12 Lead ECG:   Ventricular Rate 91 BPM    QRS Duration 76 ms    Q-T Interval 384 ms    QTC Calculation(Bazett) 472 ms    R Axis 84 degrees    T Axis 32 degrees    Diagnosis Line *** Poor data quality, interpretation may be adversely affected  Normal sinus rhythm  Cannot rule out Inferior infarct , age undetermined  Abnormal ECG    Confirmed by Krunal Dorsey ([id number]) on 3/13/2025 12:29:40 PM (03-13-25 @ 09:40)      MEDICATIONS  artificial  tears Solution 1 Both EYES every 6 hours  atorvastatin 10 Oral at bedtime  enoxaparin Injectable 40 SubCutaneous every 24 hours  gabapentin 100 Oral every 12 hours  hydrocortisone 1% Cream 1 Topical at bedtime  influenza  Vaccine (HIGH DOSE) 0.5 IntraMuscular once  levothyroxine 112 Oral daily  lisinopril 40 Oral daily  polyethylene glycol 3350 17 Oral two times a day  senna 2 Oral at bedtime  valACYclovir 500 Oral daily  vancomycin  IVPB     vancomycin  IVPB 1000 IV Intermittent every 12 hours      Weight  Weight (kg): 74.8 (03-14-25 @ 18:29)    ANTIBIOTICS:  valACYclovir 500 milliGRAM(s) Oral daily  vancomycin  IVPB      vancomycin  IVPB 1000 milliGRAM(s) IV Intermittent every 12 hours      ALLERGIES:  No Known Allergies

## 2025-03-15 NOTE — CONSULT NOTE ADULT - ASSESSMENT
ASSESSMENT  74 year old female, past medical history MM, who presents to ed s/p +blood cultures. patient presented to ed yesterday with fever and back pain, dc pending blood cultures, +blood cultures with gram positive cocci in clusters prompting return to ed. patient has been taking motrin/tylenol. reports back pain localized to mid- lumbosacral spine. denies chest pain, shortness of breath, vomiting, urinary symptoms, diarrhea.    IMPRESSION  #Staph ludgenesnsis bacteremia   - Blood CX 3/13 + in 1 of 2 sets   - CT Abdomen and Pelvis w/ IV Cont (03.13.25 @ 11:49): Moderate fecal retention. Nonobstructive bowel gas pattern. Colonic  diverticulosis with no radiographic evidence diverticulitis. Mild increase in size with nonspecific lymph node anterior to the right   psoas muscle. Mild increase in size in lymph nodes adjacent to the right  saphenofemoral vessels. Mild haziness within the right inguinal canalof  uncertain clinical significance. No significant hernia.    #Multiple Myeloma   #Back Pain     #Obesity BMI (kg/m2): 33.3, 32.3  #DM   #Abx allergy: No Known Allergies        RECOMMENDATIONS  This is a preliminary incomplete pended note, all final recommendations to follow after interview and examination of the patient.    Please call or message on [employer] Teams if with any questions.  Spectra [phone]       ASSESSMENT  74 year old female, past medical history MM, who presents to ed s/p +blood cultures. patient presented to ed yesterday with fever and back pain, dc pending blood cultures, +blood cultures with gram positive cocci in clusters prompting return to ed. patient has been taking motrin/tylenol. reports back pain localized to mid- lumbosacral spine. denies chest pain, shortness of breath, vomiting, urinary symptoms, diarrhea.    IMPRESSION  #Back Pain + Fever  #Staph lugdunensis bacteremia   - Blood CX 3/13 + in 1 of 2 sets   - CT Abdomen and Pelvis w/ IV Cont (03.13.25 @ 11:49): Moderate fecal retention. Nonobstructive bowel gas pattern. Colonic  diverticulosis with no radiographic evidence diverticulitis. Mild increase in size with nonspecific lymph node anterior to the right psoas muscle. Mild increase in size in lymph nodes adjacent to the right  saphenofemoral vessels. Mild haziness within the right inguinal canalof  uncertain clinical significance. No significant hernia.    #Back Pain     #Obesity BMI (kg/m2): 33.3, 32.3  #DM   #Abx allergy: No Known Allergies    RECOMMENDATIONS  - Staph ludgnensis in 1 set -- Coag negative staph, but can also act similarly to Staph aureus   - musculoskeletal exam is overall benign; LN by right psoas can be from chronic RLE lymphoedema, but given presentation of fevers/acute onset back pain, would try to obtain MRI of L spine to ensure no other source   - check TTE   - continue vancomycin 1g q 12 hours   -- check level with AM labs tomorrow   -- monitor creatinine     Please call or message on Microsoft Teams if with any questions.  Spectra [phone]

## 2025-03-15 NOTE — PATIENT PROFILE ADULT - DO YOU EVER NEED HELP READING HOSPITAL MATERIALS?
Missed Therapy     Patient Name: Ashleigh Marquis  Age:  58 y.o., Sex:  female  Medical Record #: [id number]  Today's Date: 10/21/2022    Discussed missed therapy with Nursing       10/21/22 [id number]   Initial Contact Note    Initial Contact Note Order Received and Verified, Occupational Therapy Evaluation in Progress with Full Report to Follow.   Interdisciplinary Plan of Care Collaboration   IDT Collaboration with  Nursing   Collaboration Comments Attempted OT eval - patient declined, agreeable to a later attempt.      no

## 2025-03-15 NOTE — PATIENT PROFILE ADULT - FALL HARM RISK - HARM RISK INTERVENTIONS

## 2025-03-15 NOTE — PATIENT PROFILE ADULT - VISION (WITH CORRECTIVE LENSES IF THE PATIENT USUALLY WEARS THEM):
left eye catarat/Partially impaired: cannot see medication labels or newsprint, but can see obstacles in path, and the surrounding layout; can count fingers at arm's length

## 2025-03-16 LAB
-  CLINDAMYCIN: SIGNIFICANT CHANGE UP
-  ERYTHROMYCIN: SIGNIFICANT CHANGE UP
-  GENTAMICIN: SIGNIFICANT CHANGE UP
-  OXACILLIN: SIGNIFICANT CHANGE UP
-  PENICILLIN: SIGNIFICANT CHANGE UP
-  RIFAMPIN: SIGNIFICANT CHANGE UP
-  TETRACYCLINE: SIGNIFICANT CHANGE UP
-  TRIMETHOPRIM/SULFAMETHOXAZOLE: SIGNIFICANT CHANGE UP
-  VANCOMYCIN: SIGNIFICANT CHANGE UP
ALBUMIN SERPL ELPH-MCNC: 3.7 G/DL — SIGNIFICANT CHANGE UP (ref 3.5–5.2)
ALP SERPL-CCNC: 80 U/L — SIGNIFICANT CHANGE UP (ref 30–115)
ALT FLD-CCNC: 29 U/L — SIGNIFICANT CHANGE UP (ref 0–41)
ANION GAP SERPL CALC-SCNC: 11 MMOL/L — SIGNIFICANT CHANGE UP (ref 7–14)
AST SERPL-CCNC: 21 U/L — SIGNIFICANT CHANGE UP (ref 0–41)
BASOPHILS # BLD AUTO: 0.02 K/UL — SIGNIFICANT CHANGE UP (ref 0–0.2)
BASOPHILS NFR BLD AUTO: 0.4 % — SIGNIFICANT CHANGE UP (ref 0–1)
BILIRUB SERPL-MCNC: 0.4 MG/DL — SIGNIFICANT CHANGE UP (ref 0.2–1.2)
BUN SERPL-MCNC: 9 MG/DL — LOW (ref 10–20)
CALCIUM SERPL-MCNC: 8.4 MG/DL — SIGNIFICANT CHANGE UP (ref 8.4–10.5)
CHLORIDE SERPL-SCNC: 105 MMOL/L — SIGNIFICANT CHANGE UP (ref 98–110)
CO2 SERPL-SCNC: 26 MMOL/L — SIGNIFICANT CHANGE UP (ref 17–32)
CREAT SERPL-MCNC: 0.6 MG/DL — LOW (ref 0.7–1.5)
CULTURE RESULTS: ABNORMAL
EGFR: 94 ML/MIN/1.73M2 — SIGNIFICANT CHANGE UP
EOSINOPHIL # BLD AUTO: 0.12 K/UL — SIGNIFICANT CHANGE UP (ref 0–0.7)
EOSINOPHIL NFR BLD AUTO: 2.3 % — SIGNIFICANT CHANGE UP (ref 0–8)
GLUCOSE SERPL-MCNC: 86 MG/DL — SIGNIFICANT CHANGE UP (ref 70–99)
HCT VFR BLD CALC: 39 % — SIGNIFICANT CHANGE UP (ref 37–47)
HGB BLD-MCNC: 12.7 G/DL — SIGNIFICANT CHANGE UP (ref 12–16)
IMM GRANULOCYTES NFR BLD AUTO: 0.2 % — SIGNIFICANT CHANGE UP (ref 0.1–0.3)
LYMPHOCYTES # BLD AUTO: 1.95 K/UL — SIGNIFICANT CHANGE UP (ref 1.2–3.4)
LYMPHOCYTES # BLD AUTO: 36.6 % — SIGNIFICANT CHANGE UP (ref 20.5–51.1)
MAGNESIUM SERPL-MCNC: 2 MG/DL — SIGNIFICANT CHANGE UP (ref 1.8–2.4)
MCHC RBC-ENTMCNC: 30 PG — SIGNIFICANT CHANGE UP (ref 27–31)
MCHC RBC-ENTMCNC: 32.6 G/DL — SIGNIFICANT CHANGE UP (ref 32–37)
MCV RBC AUTO: 92.2 FL — SIGNIFICANT CHANGE UP (ref 81–99)
METHOD TYPE: SIGNIFICANT CHANGE UP
MONOCYTES # BLD AUTO: 0.45 K/UL — SIGNIFICANT CHANGE UP (ref 0.1–0.6)
MONOCYTES NFR BLD AUTO: 8.4 % — SIGNIFICANT CHANGE UP (ref 1.7–9.3)
NEUTROPHILS # BLD AUTO: 2.78 K/UL — SIGNIFICANT CHANGE UP (ref 1.4–6.5)
NEUTROPHILS NFR BLD AUTO: 52.1 % — SIGNIFICANT CHANGE UP (ref 42.2–75.2)
NRBC BLD AUTO-RTO: 0 /100 WBCS — SIGNIFICANT CHANGE UP (ref 0–0)
ORGANISM # SPEC MICROSCOPIC CNT: ABNORMAL
ORGANISM # SPEC MICROSCOPIC CNT: ABNORMAL
ORGANISM # SPEC MICROSCOPIC CNT: SIGNIFICANT CHANGE UP
PLATELET # BLD AUTO: 162 K/UL — SIGNIFICANT CHANGE UP (ref 130–400)
POTASSIUM SERPL-MCNC: 3.8 MMOL/L — SIGNIFICANT CHANGE UP (ref 3.5–5)
POTASSIUM SERPL-SCNC: 3.8 MMOL/L — SIGNIFICANT CHANGE UP (ref 3.5–5)
PROT SERPL-MCNC: 6 G/DL — SIGNIFICANT CHANGE UP (ref 6–8)
RBC # BLD: 4.23 M/UL — SIGNIFICANT CHANGE UP (ref 4.2–5.4)
RBC # FLD: 14.5 % — SIGNIFICANT CHANGE UP (ref 11.5–14.5)
SPECIMEN SOURCE: SIGNIFICANT CHANGE UP
VANCOMYCIN FLD-MCNC: 9.2 UG/ML — SIGNIFICANT CHANGE UP (ref 5–10)
WBC # BLD: 5.33 K/UL — SIGNIFICANT CHANGE UP (ref 4.8–10.8)
WBC # FLD AUTO: 5.33 K/UL — SIGNIFICANT CHANGE UP (ref 4.8–10.8)

## 2025-03-16 PROCEDURE — 99232 SBSQ HOSP IP/OBS MODERATE 35: CPT

## 2025-03-16 PROCEDURE — 93306 TTE W/DOPPLER COMPLETE: CPT | Mod: 26

## 2025-03-16 PROCEDURE — 93970 EXTREMITY STUDY: CPT | Mod: 26

## 2025-03-16 RX ORDER — CEFAZOLIN SODIUM IN 0.9 % NACL 3 G/100 ML
2000 INTRAVENOUS SOLUTION, PIGGYBACK (ML) INTRAVENOUS EVERY 8 HOURS
Refills: 0 | Status: DISCONTINUED | OUTPATIENT
Start: 2025-03-16 | End: 2025-03-17

## 2025-03-16 RX ADMIN — POLYETHYLENE GLYCOL 3350 17 GRAM(S): 17 POWDER, FOR SOLUTION ORAL at 17:39

## 2025-03-16 RX ADMIN — LISINOPRIL 40 MILLIGRAM(S): 5 TABLET ORAL at 05:38

## 2025-03-16 RX ADMIN — Medication 112 MICROGRAM(S): at 05:31

## 2025-03-16 RX ADMIN — Medication 100 MILLIGRAM(S): at 13:22

## 2025-03-16 RX ADMIN — HYDROCORTISONE 1 APPLICATION(S): 10 CREAM TOPICAL at 21:24

## 2025-03-16 RX ADMIN — Medication 100 MILLIGRAM(S): at 21:23

## 2025-03-16 RX ADMIN — GABAPENTIN 100 MILLIGRAM(S): 400 CAPSULE ORAL at 05:31

## 2025-03-16 RX ADMIN — Medication 1 DROP(S): at 11:13

## 2025-03-16 RX ADMIN — Medication 1 DROP(S): at 23:05

## 2025-03-16 RX ADMIN — Medication 2 TABLET(S): at 21:20

## 2025-03-16 RX ADMIN — Medication 1 DROP(S): at 11:12

## 2025-03-16 RX ADMIN — POLYETHYLENE GLYCOL 3350 17 GRAM(S): 17 POWDER, FOR SOLUTION ORAL at 05:31

## 2025-03-16 RX ADMIN — Medication 1 DROP(S): at 05:35

## 2025-03-16 RX ADMIN — GABAPENTIN 100 MILLIGRAM(S): 400 CAPSULE ORAL at 17:39

## 2025-03-16 RX ADMIN — ATORVASTATIN CALCIUM 10 MILLIGRAM(S): 80 TABLET, FILM COATED ORAL at 21:20

## 2025-03-16 RX ADMIN — Medication 500 MILLIGRAM(S): at 11:35

## 2025-03-16 RX ADMIN — Medication 1 DROP(S): at 17:38

## 2025-03-16 RX ADMIN — ENOXAPARIN SODIUM 40 MILLIGRAM(S): 100 INJECTION SUBCUTANEOUS at 21:20

## 2025-03-16 RX ADMIN — Medication 250 MILLIGRAM(S): at 05:30

## 2025-03-16 NOTE — PROGRESS NOTE ADULT - ASSESSMENT
74 year old woman, past medical history MGUS (seen by H/O - see JERED) who presents to ED s/p +blood cultures. Patient presented to ed the day before with fever (101.6 at home) and low back pain w/ diff walking; Pt had WBC 13 and T 100.9 meeting criteria for SIRS, but was d/c'd pending blood cultures, +blood cultures with staph lugdunensis prompting return to ed. patient has been taking motrin/tylenol at home. reports cough and back pain. denies chest pain, shortness of breath, vomiting, urinary symptoms, diarrhea.    # pt is immunocompromised 2/2 age    # LIMIT ROUTINE LABS    # Fever (101.6 at home; 100.9 on 1st ED visit, now afeb) + low back pain (now improving); NO SEPSIS on this admit; poss bacteremia w/ Staph Lugdunensis; pt might have had rt leg cellulitis (but looks NOT infected now) (hs RLE lymphedema for few yrs)  most consistent complaint is b/l ear pain - no change w/ abx and benign exam  In ED, T 98.8, /76, HR 77, RR 18, SpO2 97%  VBG: lactate 2.2, pH 7.45, pCO2 38, pO2 58  does NOT appear toxic  EKG: NSR  CT A/P w IV Con: Moderate fecal retention. Nonobstructive bowel gas pattern. Colonic diverticulosis with no radiographic evidence diverticulitis. Mild increase in size with nonspecific lymph node anterior to the right psoas muscle. Mild increase in size in lymph nodes adjacent to the right saphenofemoral vessels. Mild haziness within the right inguinal canal of uncertain clinical significance. No significant hernia.  ESR 12; CRP 78  BCx 1 of 2 w/ staph lugdunensis - S- oxacillin  rpt BCx: 3/14 x2: neg; 3/16 x1: pending - no need for further cx's for now  MRSA nares: neg  XR LS-spine: degen changes; bony demineralization; no fx or malalign  T-spine: spondylosis; no fx  MRI L/S spine w/ gado: f/u read  Echo: f/u read  V Dupl: f/u read  ID consult  abx: d/c vanco; ancef 2gm iv q8 (per ID)  c/w neurontin 100mg po q12 for leg burning (helping)    # dry eyes w/ minor irritation  no infection  blind rt eye w/ abnl cornea  art tears ou q6    # ear pain - poss from wax  Debrox 1 gtt each ear 2x/day for 1-2 wks  outpt ENT eval, if pain persists    # Moderate Fecal Retention; H/o Diverticulosis   bowel reg: PEG q12 + senna 2 hs    # minor Transaminitis - resolved  RUQ U/S: nl liver; GB absent; CBD 7mm  no further w/u  can check hep panel - inpt or outpt  no need to follow LFTs    # H/o M-spike; has plasma cell dyscrasia but 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

## 2025-03-16 NOTE — PROGRESS NOTE ADULT - SUBJECTIVE AND OBJECTIVE BOX
SUBJECTIVE/OVERNIGHT EVENTS  Today is hospital day 1d. This morning patient was seen and examined at bedside, resting comfortably in bed. No acute or major events overnight.          MEDICATIONS  STANDING MEDICATIONS  artificial  tears Solution 1 Drop(s) Both EYES every 6 hours  atorvastatin 10 milliGRAM(s) Oral at bedtime  enoxaparin Injectable 40 milliGRAM(s) SubCutaneous every 24 hours  gabapentin 100 milliGRAM(s) Oral every 12 hours  hydrocortisone 1% Cream 1 Application(s) Topical at bedtime  influenza  Vaccine (HIGH DOSE) 0.5 milliLiter(s) IntraMuscular once  levothyroxine 112 MICROGram(s) Oral daily  lisinopril 40 milliGRAM(s) Oral daily  polyethylene glycol 3350 17 Gram(s) Oral two times a day  senna 2 Tablet(s) Oral at bedtime  valACYclovir 500 milliGRAM(s) Oral daily  vancomycin  IVPB      vancomycin  IVPB 1000 milliGRAM(s) IV Intermittent every 12 hours    PRN MEDICATIONS    VITALS  T(F): 98.4 (03-15-25 @ 13:15), Max: 98.8 (03-14-25 @ 18:29)  HR: 72 (03-15-25 @ 13:15) (72 - 87)  BP: 128/66 (03-15-25 @ 13:15) (128/66 - 154/69)  RR: 18 (03-15-25 @ 13:15) (18 - 18)  SpO2: 98% (03-15-25 @ 13:15) (96% - 98%)    PHYSICAL EXAM  GENERAL  ( x ) NAD, lying in bed comfortably     (  ) obtunded     (  ) lethargic     (  ) somnolent    HEAD  ( x ) Atraumatic     (  ) hematoma     (  ) laceration (specify location:       )     NECK  ( x ) Supple     (  ) neck stiffness     (  ) nuchal rigidity     (  )  no JVD     (  ) JVD present ( -- cm)    HEART  Rate -->  ( x ) normal rate    (  ) bradycardic    (  ) tachycardic  Rhythm -->  ( x ) regular    (  ) regularly irregular    (  ) irregularly irregular  Murmurs -->  ( x ) normal s1/s2    (  ) systolic murmur    (  ) diastolic murmur    (  ) continuous murmur     (  ) S3 present    (  ) S4 present    LUNGS  ( x )Unlabored respirations     (  ) tachypnea  ( x ) B/L air entry     (  ) decreased breath sounds in:  (location     )    ( x ) no adventitious sound     (  ) crackles     (  ) wheezing      (  ) rhonchi      (specify location:       )  (  ) chest wall tenderness (specify location:       )    ABDOMEN  ( x ) Soft     (  ) tense   |   ( x ) nondistended     (  ) distended   |   (  ) +BS     (  ) hypoactive bowel sounds     (  ) hyperactive bowel sounds  ( x ) nontender     (  ) RUQ tenderness     (  ) RLQ tenderness     (  ) LLQ tenderness     (  ) epigastric tenderness     (  ) diffuse tenderness  (  ) Splenomegaly      (  ) Hepatomegaly      (  ) Jaundice     (  ) ecchymosis     EXTREMITIES  (  ) Normal     (  ) Rash     (  ) ecchymosis     (  ) varicose veins      (  ) pitting edema     ( Rt ) non-pitting edema   (  ) ulceration     (  ) gangrene:     (location:     )    NERVOUS SYSTEM  ( x ) A&Ox3     (  ) confused     (  ) lethargic  CN II-XII:     (  ) Intact     (  ) focal deficits  (Specify:     )   Upper extremities:     (  ) strength X/5     (  ) focal deficit (specify:    )  Lower extremities:     (  ) strength  X/5    (  ) focal deficit (specify:    )      LABS             12.5   5.55  )-----------( 132      ( 03-15-25 @ 05:32 )             38.4     142  |  106  |  12  -------------------------<  97   03-15-25 @ 05:32  3.7  |  25  |  0.6    Ca      8.2     03-15-25 @ 05:32  Mg     1.9     03-15-25 @ 05:32    TPro  6.1  /  Alb  3.7  /  TBili  0.3  /  DBili  x   /  AST  31  /  ALT  36  /  AlkPhos  80  /  GGT  x     03-15-25 @ 05:32        Urinalysis Basic - ( 15 Mar 2025 05:32 )    Color: x / Appearance: x / SG: x / pH: x  Gluc: 97 mg/dL / Ketone: x  / Bili: x / Urobili: x   Blood: x / Protein: x / Nitrite: x   Leuk Esterase: x / RBC: x / WBC x   Sq Epi: x / Non Sq Epi: x / Bacteria: x          Urinalysis with Rflx Culture (collected 13 Mar 2025 12:00)    Culture - Blood (collected 13 Mar 2025 10:13)  Source: Blood Blood-Peripheral  Preliminary Report (14 Mar 2025 22:01):    No growth at 24 hours    Culture - Blood (collected 13 Mar 2025 10:13)  Source: Blood Blood-Peripheral  Gram Stain (14 Mar 2025 15:33):    Growth in aerobic bottle: Gram Positive Cocci in Clusters  Preliminary Report (15 Mar 2025 12:09):    Growth in aerobic bottle: Staphylococcus lugdunensis    Direct identification is available within approximately 3-5    hours either by Blood Panel Multiplexed PCR or Direct    MALDI-TOF. Details: [email or web address]  Organism: Blood Culture PCR (14 Mar 2025 18:06)  Organism: Blood Culture PCR (14 Mar 2025 18:06)  
  BRIANNA JIMENEZ  74y  Female  ***My note supersedes ALL resident notes that I sign.  My corrections for their notes are in my note.***    I can be reached directly via Teams or my office number is [phone] or [phone].    INTERVAL EVENTS: Here for f/u of recent fever. Pt feels OK. Only c/o b/l ear pain, this in the canals (neg on exam). Legs feel better.    T(F): 97.7 (03-16-25 @ 04:35), Max: 98.4 (03-15-25 @ 13:15)  HR: 68 (03-16-25 @ 04:35) (68 - 72)  BP: 136/67 (03-16-25 @ 04:35) (128/66 - 142/79)  RR: 18 (03-16-25 @ 04:35) (18 - 18)  SpO2: 97% (03-16-25 @ 04:35) (97% - 98%)    Gen: NAD  HEENT: lt eye nl, rt eye w/ hazy cornea and marked decr vision; EOMI, mouth clr, nose clr; ears w/ some pain in canals, but neg ext exam  Neck: no nodes, no JVD, thyroid nl  lungs: clr  hrt: s1 s2 rrr no murmur  abd: soft, NT/ND, no HS megaly  ext: no edema lt leg, no c/c  chr lymphedema on rt leg - mild; no infection (cellulitis) today  neuro: aa ox3, cn intact, can move all 4 ext    LABS:                      12.7    (    92.2   5.33  )-----------( ---------      162      ( 16 Mar 2025 05:37 )             39.0    (    14.5     142   (   105   (   86      03-16-25 @ 05:37  ----------------------               3.8   (   26   (   9                             -----                        0.6  Ca  8.4   Mg  2.0    P   --     LFT  6.0  (  0.4  (  21       03-16-25 @ 05:37  -------------------------  3.7  (  80  (  29    Urinalysis Basic - ( 16 Mar 2025 05:37 )    Color: x / Appearance: x / SG: x / pH: x  Gluc: 86 mg/dL / Ketone: x  / Bili: x / Urobili: x   Blood: x / Protein: x / Nitrite: x   Leuk Esterase: x / RBC: x / WBC x   Sq Epi: x / Non Sq Epi: x / Bacteria: x    Culture - Blood (collected 03-14-25 @ 19:49)  Source: Blood Blood  Preliminary Report (03-16-25 @ 03:02):    No growth at 24 hours    Culture - Blood (collected 03-14-25 @ 19:49)  Source: Blood Blood  Preliminary Report (03-16-25 @ 03:02):    No growth at 24 hours    Culture - Blood (collected 03-13-25 @ 10:13)  Source: Blood Blood-Peripheral  Preliminary Report (03-15-25 @ 22:01):    No growth at 48 Hours    Culture - Blood (collected 03-13-25 @ 10:13)  Source: Blood Blood-Peripheral  Gram Stain (03-14-25 @ 15:33):    Growth in aerobic bottle: Gram Positive Cocci in Clusters  Final Report (03-16-25 @ 07:27):    Growth in aerobic bottle: Staphylococcus lugdunensis    Direct identification is available within approximately 3-5    hours either by Blood Panel Multiplexed PCR or Direct    MALDI-TOF. Details: [email or web address]  Organism: Blood Culture PCR  Staphylococcus lugdunensis (03-16-25 @ 07:27)  Organism: Staphylococcus lugdunensis (03-16-25 @ 07:27)      Method Type: BARRY      -  Clindamycin: R >4      -  Erythromycin: R >4      -  Gentamicin: S <=4 Should not be used as monotherapy      -  Oxacillin: S <=0.25      -  Penicillin: R >2      -  Rifampin: S <=1 Should not be used as monotherapy      -  Tetracycline: S <=4      -  Trimethoprim/Sulfamethoxazole: S <=0.5/9.5      -  Vancomycin: S 0.5  Organism: Blood Culture PCR (03-16-25 @ 07:27)      Method Type: PCR      -  Staphylococcus lugdunensis: Detec    RADIOLOGY & ADDITIONAL TESTS:    MEDICATIONS:  valACYclovir 500 milliGRAM(s) Oral daily  vancomycin  IVPB      vancomycin  IVPB 1000 milliGRAM(s) IV Intermittent every 12 hours    artificial  tears Solution 1 Drop(s) Both EYES every 6 hours  atorvastatin 10 milliGRAM(s) Oral at bedtime  enoxaparin Injectable 40 milliGRAM(s) SubCutaneous every 24 hours  gabapentin 100 milliGRAM(s) Oral every 12 hours  hydrocortisone 1% Cream 1 Application(s) Topical at bedtime  influenza  Vaccine (HIGH DOSE) 0.5 milliLiter(s) IntraMuscular once  levothyroxine 112 MICROGram(s) Oral daily  lisinopril 40 milliGRAM(s) Oral daily  polyethylene glycol 3350 17 Gram(s) Oral two times a day  senna 2 Tablet(s) Oral at bedtime

## 2025-03-17 ENCOUNTER — TRANSCRIPTION ENCOUNTER (OUTPATIENT)
Age: 75
End: 2025-03-17

## 2025-03-17 VITALS
DIASTOLIC BLOOD PRESSURE: 83 MMHG | HEART RATE: 64 BPM | OXYGEN SATURATION: 98 % | SYSTOLIC BLOOD PRESSURE: 149 MMHG | TEMPERATURE: 98 F | RESPIRATION RATE: 18 BRPM

## 2025-03-17 PROCEDURE — 99239 HOSP IP/OBS DSCHRG MGMT >30: CPT

## 2025-03-17 RX ORDER — CEFADROXIL 500 MG/1
2 CAPSULE ORAL
Qty: 20 | Refills: 0
Start: 2025-03-17 | End: 2025-03-21

## 2025-03-17 RX ORDER — CEFADROXIL 500 MG/1
1 CAPSULE ORAL
Qty: 10 | Refills: 0
Start: 2025-03-17 | End: 2025-03-21

## 2025-03-17 RX ORDER — GABAPENTIN 400 MG/1
1 CAPSULE ORAL
Qty: 60 | Refills: 0
Start: 2025-03-17 | End: 2025-04-15

## 2025-03-17 RX ADMIN — Medication 112 MICROGRAM(S): at 06:06

## 2025-03-17 RX ADMIN — LISINOPRIL 40 MILLIGRAM(S): 5 TABLET ORAL at 06:04

## 2025-03-17 RX ADMIN — Medication 1 DROP(S): at 06:05

## 2025-03-17 RX ADMIN — Medication 1 DROP(S): at 11:16

## 2025-03-17 RX ADMIN — Medication 100 MILLIGRAM(S): at 06:04

## 2025-03-17 RX ADMIN — POLYETHYLENE GLYCOL 3350 17 GRAM(S): 17 POWDER, FOR SOLUTION ORAL at 06:04

## 2025-03-17 RX ADMIN — Medication 500 MILLIGRAM(S): at 11:16

## 2025-03-17 RX ADMIN — GABAPENTIN 100 MILLIGRAM(S): 400 CAPSULE ORAL at 06:04

## 2025-03-17 NOTE — DISCHARGE NOTE PROVIDER - HOSPITAL COURSE
74 year old woman, past medical history MGUS (seen by H/O as OP) who presents to ED s/p +blood cultures. Patient presented to ed the day before with fever (101.6 at home) and low back pain w/ diff walking; Pt had WBC 13 and T 100.9 meeting criteria for SIRS, but was d/c'd pending blood cultures, +blood cultures with staph lugdunensis prompting return to ed. patient has been taking motrin/tylenol at home. reports cough and back pain. denies chest pain, shortness of breath, vomiting, urinary symptoms, diarrhea.    In ED, T 98.8, /76, HR 77, RR 18, SpO2 97%  Labs: AST 47, ALT 46  VBG: lactate 2.2, pH 7.45, pCO2 38, pO2 58    EKG: NSR  CT A/P w IV Con: Moderate fecal retention. Nonobstructive bowel gas pattern. Colonic   diverticulosis with no radiographic evidence diverticulitis. Mild increase in size with nonspecific lymph node anterior to the right   psoas muscle. Mild increase in size in lymph nodes adjacent to the right   saphenofemoral vessels. Mild haziness within the right inguinal canalof   uncertain clinical significance. No significant hernia.    s/p cefepime 2g, vanc 1g, 1L LR bolus.   Admitted to medicine for further management.       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; scl have been injected but no crusting of pus-like d/c; vision preserved in lt eye; nose and throat are fine; no neck pain or odynophagia; minor dry cough; no SOB; no N/V/D; no urine symp; minor arthritic pains; has chr lymphedema rt leg and b/l burning pain in both lower legs, but no evid for cellulitis (not read, not warm and not that tender); pt does NOT have MM, she has a minor MGUS and has seen H/O (HIE reviewed); pt does NOT look sick      # Fever (101.6 at home; 100.9 on 1st ED visit, now afeb) + low back pain (now improving); NO SEPSIS on this admit; poss bacteremia w/ Staph Lugdunensis (In 1 set);   #pt might have had rt leg cellulitis (but looks NOT infected now) (hs RLE lymphedema for few yrs)  - most consistent complaint is b/l ear pain - no change w/ abx and benign exam  - In ED, T 98.8, /76, HR 77, RR 18, SpO2 97%  - VBG: lactate 2.2, pH 7.45, pCO2 38, pO2 58  - does NOT appear toxic  - EKG: NSR  - CT A/P w IV Con: Moderate fecal retention. Nonobstructive bowel gas pattern. Colonic diverticulosis with no radiographic evidence diverticulitis. Mild increase in size with nonspecific lymph node anterior to the right psoas muscle. Mild increase in size in lymph nodes adjacent to the right saphenofemoral vessels. Mild haziness within the right inguinal canal of uncertain clinical significance. No significant hernia.  - ESR 12; CRP 78  - BCx 1 of 2 w/ staph lugdunensis - S- oxacillin  - rpt BCx: 3/14 x2: neg; 3/16 x1: pending - no need for further cx's for now  - MRSA nares: neg  - XR LS-spine: degen changes; bony demineralization; no fx or malalign  - T-spine: spondylosis; no fx  - MRI L/S spine w/ gado: Unremarkable study, Heterogeneous marrow with several focal lesions. This is nonspecific but can be seen asa variant in patients with osteoporosis.  - Echo: LV EF of 58 %., G1DD, Mild MR &TR  - V Dupl: No DVT, Superficial thrombophlebitis of the left lesser saphenous vein.  - ID consult  - abx: d/c vanco; ancef 2gm iv q8 (per ID) --> can switch to PO cefadroxil 1g BID to complete until 3/22 (7 days of antibiotics)  - c/w neurontin 100mg po q12 for leg burning (helping)    # dry eyes w/ minor irritation  no infection  blind rt eye w/ abnl cornea  art tears ou q6    # ear pain - poss from wax  Debrox 1 gtt each ear 2x/day for 1-2 wks  outpt ENT eval, if pain persists    # Moderate Fecal Retention; H/o Diverticulosis   bowel reg: PEG q12 + senna 2 hs    # minor Transaminitis - resolved  - RUQ U/S: nl liver; GB absent; CBD 7mm  - no further w/u  - can check hep panel as OP  - no need to follow LFTs    # H/o M-spike; 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 Patient presented to ed the day before with fever (101.6 at home) and low back pain w/ diff walking; Pt had WBC 13 and T 100.9 meeting criteria for SIRS, but was d/c'd pending blood cultures, +blood cultures with staph lugdunensis prompting return to ed. patient has been taking motrin/tylenol at home. reports cough and back pain. denies chest pain, shortness of breath, vomiting, urinary symptoms, diarrhea.    s/p cefepime 2g, vanc 1g, 1L LR bolus.   Admitted to medicine for further management.       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; scl have been injected but no crusting of pus-like d/c; vision preserved in lt eye; nose and throat are fine; no neck pain or odynophagia; minor dry cough; no SOB; no N/V/D; no urine symp; minor arthritic pains; has chr lymphedema rt leg and b/l burning pain in both lower legs, but no evid for cellulitis (not read, not warm and not that tender); pt does NOT have MM, she has a minor MGUS and has seen H/O (HIE reviewed); pt does NOT look sick      # Fever (101.6 at home; 100.9 on 1st ED visit, now afeb) + low back pain (now improving); NO SEPSIS on this admit; poss bacteremia w/ Staph Lugdunensis (In 1 set);  resolved   #pt might have had rt leg cellulitis (but looks NOT infected now) (hs RLE lymphedema for few yrs)  - most consistent complaint is b/l ear pain - no change w/ abx and benign exam  - does NOT appear toxic  - EKG: NSR  - CT A/P w IV Con: Moderate fecal retention. Nonobstructive bowel gas pattern. Colonic diverticulosis with no radiographic evidence diverticulitis. Mild increase in size with nonspecific lymph node anterior to the right psoas muscle. Mild increase in size in lymph nodes adjacent to the right saphenofemoral vessels. Mild haziness within the right inguinal canal of uncertain clinical significance. No significant hernia.  - ESR 12; CRP 78  - BCx 1 of 2 w/ staph lugdunensis - S- oxacillin  - rpt BCx: 3/14 x2: neg; 3/16 x1: pending - no need for further cx's for now  - MRSA nares: neg  - XR LS-spine: degen changes; bony demineralization; no fx or malalign  - T-spine: spondylosis; no fx  - MRI L/S spine w/ gado: Unremarkable study, Heterogeneous marrow with several focal lesions. This is nonspecific but can be seen asa variant in patients with osteoporosis.  - Echo: LV EF of 58 %., G1DD, Mild MR &TR  - V Dupl: No DVT, Superficial thrombophlebitis of the left lesser saphenous vein.  - ID consult  - abx: d/c vanco; ancef 2gm iv q8 (per ID) --> can switch to PO cefadroxil 1g BID to complete until 3/22 (7 days of antibiotics)  - c/w neurontin 100mg po q12 for leg burning (helping)    # dry eyes w/ minor irritation  no infection  blind rt eye w/ abnl cornea  art tears ou q6    # ear pain - poss from wax, better   Debrox 1 gtt each ear 2x/day for 1-2 wks  outpt ENT eval, if pain persists    # Moderate Fecal Retention; H/o Diverticulosis   bowel reg: PEG q12 + senna 2 hs    # minor Transaminitis - resolved  - RUQ U/S: nl liver; GB absent; CBD 7mm  - no further w/u  - can check hep panel as OP  - no need to follow LFTs    # H/o M-spike; 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

## 2025-03-17 NOTE — DISCHARGE NOTE PROVIDER - ATTENDING DISCHARGE PHYSICAL EXAMINATION:
Patient seen at bedside. no complaints. wants to be discharged. ID cleared patient for discharge . patient to follow up with PCP and specialists as scheduled. also need to go to ed in case of new or worsening symptoms.

## 2025-03-17 NOTE — CHART NOTE - NSCHARTNOTEFT_GEN_A_CORE
Chart reviewed.     MR Lumbar Spine w/wo IV Cont (03.15.25 @ 21:38): 1.  Unremarkable study 2.  Heterogeneous marrow with several focal lesions. This is nonspecific   but can be seen asa variant in patients with osteoporosis.    TTE with no significant valvulopathy.     Source of sepsis on admission is unclear -- Blood cx with Donald bobo -- above imaging negative and back pain has resolved; very low suspicion for endocarditis.  can switch to PO cefadroxil 1g BID to complete until 3/22 (7 days of antibiotics)    Please call or message on Microsoft Teams if with any questions.  Spectra [phone]

## 2025-03-17 NOTE — DISCHARGE NOTE NURSING/CASE MANAGEMENT/SOCIAL WORK - FINANCIAL ASSISTANCE
[facility] provides services at a reduced cost to those who are determined to be eligible through [facility]’s financial assistance program. Information regarding [facility]’s financial assistance program can be found by going to [email or web address] or by calling [phone].

## 2025-03-17 NOTE — DISCHARGE NOTE NURSING/CASE MANAGEMENT/SOCIAL WORK - PATIENT PORTAL LINK FT
You can access the FollowMyHealth Patient Portal offered by [facility] by registering at the following website: [email or web address]. By joining [employer]’s FollowMyHealth portal, you will also be able to view your health information using other applications (apps) compatible with our system.

## 2025-03-17 NOTE — DISCHARGE NOTE NURSING/CASE MANAGEMENT/SOCIAL WORK - NSDCPEFALRISK_GEN_ALL_CORE
For information on Fall & Injury Prevention, visit: [email or web address] OR  [email or web address] OR  https://www.cdc.gov/steadi/patient.html

## 2025-03-17 NOTE — DISCHARGE NOTE PROVIDER - CARE PROVIDER_API CALL
Jairo Smallanor  Internal Medicine  TERESA DIEHL, Phys,    Phone: ([phone]  Fax: ()-  Follow Up Time: 2 weeks

## 2025-03-17 NOTE — DISCHARGE NOTE PROVIDER - NSDCFUSCHEDAPPT_GEN_ALL_CORE_FT
Jesika Meier  [facility]  [id number]  Scheduled Appointment: 04/11/2025

## 2025-03-17 NOTE — DISCHARGE NOTE PROVIDER - NSDCCPCAREPLAN_GEN_ALL_CORE_FT
PRINCIPAL DISCHARGE DIAGNOSIS  Diagnosis: Cellulitis  Assessment and Plan of Treatment: Cellulitis is an infection in the deepest layer of the skin and tissue beneath the skin. In some cases, the infection also affects the muscle. Cellulitis is caused by bacteria. The bacteria can enter the body through broken skin. This can happen with a cut, scratch, animal bite, or an insect bite that has been scratched. You were treated in the hospital with antibiotics and fluids.  :  Take the prescribed antibiotic medicine you are given as directed until it is gone. Take it even if you feel better. It treats the infection and stops it from returning. Not taking all the medicine can make future infections hard to treat. Keep the infected area clean. When possible, raise the infected area above the level of your heart. This helps keep swelling down. Talk with your healthcare provider if you are in pain. Ask what kind of over-the-counter medicine you can take for pain. Apply clean bandages as advised.  :  Wash your hands often to prevent spreading the infection. In the future, wash your hands before and after you touch cuts, scratches, or bandages. This will help prevent infection.   :  Call your doctor immediately if you have any of the following:   - Difficulty or pain when moving the joints above or below the infected area  - Discharge or pus draining from the area,   - Fever of 100.4°F (38°C) or higher  - Pain that gets worse in or around the infected site,   - Worsening redness in or around the infected area, especially if the area of redness expands to a wider area  - Shaking chills  - Swelling of the infected area  - Vomiting

## 2025-03-17 NOTE — DISCHARGE NOTE NURSING/CASE MANAGEMENT/SOCIAL WORK - NSDCFUADDAPPT_GEN_ALL_CORE_FT
MD at bedside.   follow up with PCP and specialists as scheduled  GO to ed in case of new or worsening symptoms  1-2 cups/cans per day

## 2025-03-17 NOTE — DISCHARGE NOTE PROVIDER - NSDCMRMEDTOKEN_GEN_ALL_CORE_FT
Albuterol (Eqv-ProAir HFA) 90 mcg/inh inhalation aerosol: 2 puff(s) inhaled 2 times a day as needed for bronchospasm/wheeze  atorvastatin 10 mg oral tablet: 1 tab(s) orally once a day  hydroCHLOROthiazide 25 mg oral tablet: 1 tab(s) orally once a day  levothyroxine 112 mcg (0.112 mg) oral tablet: 1 tab(s) orally once a day  lisinopril 40 mg oral tablet: 1 tab(s) orally once a day  Valtrex 500 mg oral tablet: 1 tab(s) orally once a day   Albuterol (Eqv-ProAir HFA) 90 mcg/inh inhalation aerosol: 2 puff(s) inhaled 2 times a day as needed for bronchospasm/wheeze  atorvastatin 10 mg oral tablet: 1 tab(s) orally once a day  cefadroxil 1000 mg oral tablet: 1 tab(s) orally 2 times a day Last day is 3/22/2025  gabapentin 100 mg oral capsule: 1 cap(s) orally every 12 hours  hydroCHLOROthiazide 25 mg oral tablet: 1 tab(s) orally once a day  levothyroxine 112 mcg (0.112 mg) oral tablet: 1 tab(s) orally once a day  lisinopril 40 mg oral tablet: 1 tab(s) orally once a day  Valtrex 500 mg oral tablet: 1 tab(s) orally once a day   Albuterol (Eqv-ProAir HFA) 90 mcg/inh inhalation aerosol: 2 puff(s) inhaled 2 times a day as needed for bronchospasm/wheeze  atorvastatin 10 mg oral tablet: 1 tab(s) orally once a day  cefadroxil 500 mg oral capsule: 2 cap(s) orally 2 times a day Last day is 3/22/2025  gabapentin 100 mg oral capsule: 1 cap(s) orally every 12 hours  hydroCHLOROthiazide 25 mg oral tablet: 1 tab(s) orally once a day  levothyroxine 112 mcg (0.112 mg) oral tablet: 1 tab(s) orally once a day  lisinopril 40 mg oral tablet: 1 tab(s) orally once a day  Valtrex 500 mg oral tablet: 1 tab(s) orally once a day

## 2025-03-18 LAB
CULTURE RESULTS: SIGNIFICANT CHANGE UP
SPECIMEN SOURCE: SIGNIFICANT CHANGE UP

## 2025-03-20 LAB
CULTURE RESULTS: SIGNIFICANT CHANGE UP
CULTURE RESULTS: SIGNIFICANT CHANGE UP
SPECIMEN SOURCE: SIGNIFICANT CHANGE UP
SPECIMEN SOURCE: SIGNIFICANT CHANGE UP

## 2025-03-21 LAB
CULTURE RESULTS: SIGNIFICANT CHANGE UP
SPECIMEN SOURCE: SIGNIFICANT CHANGE UP

## 2025-03-26 DIAGNOSIS — D47.2 MONOCLONAL GAMMOPATHY: ICD-10-CM

## 2025-03-26 DIAGNOSIS — L03.115 CELLULITIS OF RIGHT LOWER LIMB: ICD-10-CM

## 2025-03-26 DIAGNOSIS — H92.03 OTALGIA, BILATERAL: ICD-10-CM

## 2025-03-26 DIAGNOSIS — I08.1 RHEUMATIC DISORDERS OF BOTH MITRAL AND TRICUSPID VALVES: ICD-10-CM

## 2025-03-26 DIAGNOSIS — R59.0 LOCALIZED ENLARGED LYMPH NODES: ICD-10-CM

## 2025-03-26 DIAGNOSIS — R78.81 BACTEREMIA: ICD-10-CM

## 2025-03-26 DIAGNOSIS — E66.9 OBESITY, UNSPECIFIED: ICD-10-CM

## 2025-03-26 DIAGNOSIS — B95.7 OTHER STAPHYLOCOCCUS AS THE CAUSE OF DISEASES CLASSIFIED ELSEWHERE: ICD-10-CM

## 2025-03-26 DIAGNOSIS — M19.90 UNSPECIFIED OSTEOARTHRITIS, UNSPECIFIED SITE: ICD-10-CM

## 2025-03-26 DIAGNOSIS — D84.89 OTHER IMMUNODEFICIENCIES: ICD-10-CM

## 2025-03-26 DIAGNOSIS — K59.00 CONSTIPATION, UNSPECIFIED: ICD-10-CM

## 2025-03-26 DIAGNOSIS — K57.30 DIVERTICULOSIS OF LARGE INTESTINE WITHOUT PERFORATION OR ABSCESS WITHOUT BLEEDING: ICD-10-CM

## 2025-03-26 DIAGNOSIS — Q13.4 OTHER CONGENITAL CORNEAL MALFORMATIONS: ICD-10-CM

## 2025-03-26 DIAGNOSIS — H54.61 UNQUALIFIED VISUAL LOSS, RIGHT EYE, NORMAL VISION LEFT EYE: ICD-10-CM

## 2025-03-26 DIAGNOSIS — M54.50 LOW BACK PAIN, UNSPECIFIED: ICD-10-CM

## 2025-03-26 DIAGNOSIS — H04.123 DRY EYE SYNDROME OF BILATERAL LACRIMAL GLANDS: ICD-10-CM

## 2025-03-26 DIAGNOSIS — R74.01 ELEVATION OF LEVELS OF LIVER TRANSAMINASE LEVELS: ICD-10-CM

## 2025-03-26 DIAGNOSIS — I89.0 LYMPHEDEMA, NOT ELSEWHERE CLASSIFIED: ICD-10-CM

## 2025-04-11 ENCOUNTER — APPOINTMENT (OUTPATIENT)
Dept: RHEUMATOLOGY | Facility: CLINIC | Age: 75
End: 2025-04-11